# Patient Record
Sex: MALE | Race: WHITE | Employment: FULL TIME | URBAN - METROPOLITAN AREA
[De-identification: names, ages, dates, MRNs, and addresses within clinical notes are randomized per-mention and may not be internally consistent; named-entity substitution may affect disease eponyms.]

---

## 2019-03-05 DIAGNOSIS — M25.512 LEFT SHOULDER PAIN, UNSPECIFIED CHRONICITY: Primary | ICD-10-CM

## 2019-03-07 ENCOUNTER — OFFICE VISIT (OUTPATIENT)
Dept: ORTHOPEDIC SURGERY | Age: 59
End: 2019-03-07
Payer: COMMERCIAL

## 2019-03-07 VITALS — WEIGHT: 225 LBS | HEIGHT: 74 IN | BODY MASS INDEX: 28.88 KG/M2

## 2019-03-07 DIAGNOSIS — M75.102 TEAR OF LEFT ROTATOR CUFF, UNSPECIFIED TEAR EXTENT: Primary | ICD-10-CM

## 2019-03-07 PROCEDURE — 99203 OFFICE O/P NEW LOW 30 MIN: CPT | Performed by: ORTHOPAEDIC SURGERY

## 2019-03-07 RX ORDER — ROSUVASTATIN CALCIUM 10 MG/1
10 TABLET, COATED ORAL DAILY
Refills: 5 | COMMUNITY
Start: 2019-02-25

## 2019-03-15 ENCOUNTER — HOSPITAL ENCOUNTER (OUTPATIENT)
Dept: GENERAL RADIOLOGY | Age: 59
Discharge: HOME | End: 2019-03-17
Payer: COMMERCIAL

## 2019-03-15 ENCOUNTER — HOSPITAL ENCOUNTER (OUTPATIENT)
Dept: MRI IMAGING | Age: 59
Discharge: HOME | End: 2019-03-17
Payer: COMMERCIAL

## 2019-03-15 DIAGNOSIS — T15.90XA FOREIGN BODY IN EYE, UNSPECIFIED LATERALITY, INITIAL ENCOUNTER: ICD-10-CM

## 2019-03-15 DIAGNOSIS — M75.102 TEAR OF LEFT ROTATOR CUFF, UNSPECIFIED TEAR EXTENT: ICD-10-CM

## 2019-03-15 PROCEDURE — 70030 X-RAY EYE FOR FOREIGN BODY: CPT

## 2019-03-15 PROCEDURE — 73221 MRI JOINT UPR EXTREM W/O DYE: CPT

## 2019-04-02 ENCOUNTER — OFFICE VISIT (OUTPATIENT)
Dept: ORTHOPEDIC SURGERY | Age: 59
End: 2019-04-02
Payer: COMMERCIAL

## 2019-04-02 VITALS — WEIGHT: 225 LBS | HEIGHT: 74 IN | BODY MASS INDEX: 28.88 KG/M2

## 2019-04-02 DIAGNOSIS — M75.02 ADHESIVE CAPSULITIS OF LEFT SHOULDER: Primary | ICD-10-CM

## 2019-04-02 PROCEDURE — 4004F PT TOBACCO SCREEN RCVD TLK: CPT | Performed by: ORTHOPAEDIC SURGERY

## 2019-04-02 PROCEDURE — 99214 OFFICE O/P EST MOD 30 MIN: CPT | Performed by: ORTHOPAEDIC SURGERY

## 2019-04-02 PROCEDURE — G8427 DOCREV CUR MEDS BY ELIG CLIN: HCPCS | Performed by: ORTHOPAEDIC SURGERY

## 2019-04-02 PROCEDURE — 3017F COLORECTAL CA SCREEN DOC REV: CPT | Performed by: ORTHOPAEDIC SURGERY

## 2019-04-02 PROCEDURE — G8419 CALC BMI OUT NRM PARAM NOF/U: HCPCS | Performed by: ORTHOPAEDIC SURGERY

## 2019-04-02 RX ORDER — MELOXICAM 15 MG/1
15 TABLET ORAL DAILY
Qty: 30 TABLET | Refills: 2 | Status: SHIPPED | OUTPATIENT
Start: 2019-04-02 | End: 2019-05-28 | Stop reason: SDUPTHER

## 2019-04-02 NOTE — PROGRESS NOTES
Chief Complaint   Patient presents with    Shoulder Pain     Left shoulder MRI follow up. HPI:    Patient is 60 y.o. male complaining of atraumatic insidious onset of left shoulder pain and weakness for 3-4 months. Patient does not recall an injury but noticed that he had progressive weakness. He is right-hand-dominant but noticed that her left shoulder was getting weak when reaching out for such things as pens or pieces of paper. Patient tried Naprosyn, ice, rest without much relief. Patient works at a [employer]. Here today for MRI review. ROS:    Skin: (-) rash,(-) psoriasis,(-) eczema, (-)skin cancer. Neurologic: (-)numbness, (-)tingling, (-)headaches, (-) LOC. Cardiovascular: (-) Chest pain, (-) swelling in legs/feet, (-) SOB, (-) cramping in legs/feet with walking. All other review of systems negative except stated above or in HPI      Past Medical History:   Diagnosis Date    Diabetes mellitus ([facility] [address]     Environmental and seasonal allergies     Hyperlipidemia     Shingles      Past Surgical History:   Procedure Laterality Date    NASAL SEPTUM SURGERY  1996       Current Outpatient Medications:     meloxicam (MOBIC) 15 MG tablet, Take 1 tablet by mouth daily Take with food. , Disp: 30 tablet, Rfl: 2    rosuvastatin (CRESTOR) 10 MG tablet, , Disp: , Rfl: 5    metFORMIN (GLUCOPHAGE) 1000 MG tablet, Take 1,000 mg by mouth 2 times daily (with meals), Disp: , Rfl:     montelukast (SINGULAIR) 10 MG tablet, Take 10 mg by mouth nightly, Disp: , Rfl:   No Known Allergies  Social History     Socioeconomic History    Marital status:      Spouse name: Not on file    Number of children: Not on file    Years of education: Not on file    Highest education level: Not on file   Occupational History    Not on file   Social Needs    Financial resource strain: Not on file    Food insecurity:     Worry: Not on file     Inability: Not on file    Transportation needs:     Medical: Not on file     Non-medical: Not on file   Tobacco Use    Smoking status: Never Smoker   Substance and Sexual Activity    Alcohol use: Yes     Comment: social    Drug use: No    Sexual activity: Yes     Partners: Female   Lifestyle    Physical activity:     Days per week: Not on file     Minutes per session: Not on file    Stress: Not on file   Relationships    Social connections:     Talks on phone: Not on file     Gets together: Not on file     Attends Jew service: Not on file     Active member of club or organization: Not on file     Attends meetings of clubs or organizations: Not on file     Relationship status: Not on file    Intimate partner violence:     Fear of current or ex partner: Not on file     Emotionally abused: Not on file     Physically abused: Not on file     Forced sexual activity: Not on file   Other Topics Concern    Not on file   Social History Narrative    Not on file     No family history on file. Physical Exam:    Ht 6' 2\" (1.88 m)   Wt 225 lb (102.1 kg)   BMI 28.89 kg/m²     GENERAL: alert, appears stated age, cooperative, no acute distress    HEENT: Head is normocephalic, atraumatic. PERRLA. SKIN: Clean, dry, intact. There is not any cellulitis or cutaneous lesions noted in the lower extremities    PULMONARY: breathing is regular and unlabored, no acute distress    CV: The bilateral upper and lower extremities are warm and well-perfused with brisk capillary refill. 2+ pulses UE and LE bilateral.     PSYCHIATRY: Pleasant mood, appropriate behavior, follows commands    NEURO: Sensation is intact distally with light touch with no alteration. Motor exam of the lower extremities show quadriceps, hamstrings, foot dorsiflexion and plantarflexion grossly intact 5/5. LYMPH: No lymphedema present distally in upper or lower extremity. MUSCULOSKELETAL:  Shoulder Exam:  Examination of the left shoulder shows: There is not a deformity. There is not erythema.   There is not soft tissue swelling. Deltoid region is  tender to palpation. AC Joint is  tender to palpation. Clavicle is not tender to palpation. Bicipital Groove is not tender to palpation. Pectoralis  is not tender to palpation. Scapula/ trapezius is  tender to palpation. There is  weakness with supraspinatus testing. There is  pain with supraspinatus testing. Yergason Test negative. Drop Arm Test positive. Apprehension Test not tested. Cross Arm Test positive. Supraspinatus Test positive. Shoulder ROM- 120/120/20          Imaging:  Imaging reviewed from outside facility of left shoulder reveals no acute osseous abnormality or DJD. Mri Shoulder Left Wo Contrast    Result Date: 3/15/2019  Reading location:  200 Indication: Left shoulder pain, suspected rotator cuff tear. Comparison: None available. Technique: Multiplanar, multisequence MR imaging of the left shoulder was performed without administration of intravenous or intra-articular contrast. FINDINGS: There is mild increased signal within the distal fibers of the supraspinatus tendon, compatible with mild tendinopathy. The infraspinatus, subscapularis, and teres minor tendons are intact. The muscles of the rotator cuff demonstrate normal muscular volume and signal. The articular biceps tendon is within the bicipital groove. The intra-articular biceps tendon is intact. Lack of intra-articular contrast limits evaluation of the labrum. There is increased signal within the anterior labrum with a small paralabral cyst (series 3, image 14), compatible with a tear. The articular cartilage is grossly maintained. There is a trace amount of joint fluid. There is a trace amount of fluid within the subacromial/subdeltoid bursa. There is periligamentous edema and indistinctness of the inferior glenohumeral ligament, correlate for adhesive capsulitis. There is mild acromioclavicular joint arthrosis. There is no evidence of fracture or osteonecrosis.      1. Mild tendinopathy of the supraspinatus tendon. 2. Suggestion of an anterior labral tear with a small paralabral cyst. 3. Periligamentous edema and indistinctness of the inferior glenohumeral ligament, correlate for adhesive capsulitis. 4. Mild acromioclavicular joint arthrosis with trace subacromial/subdeltoid bursitis. Xr Eye Foreign Body    Result Date: 3/15/2019  Reading location:  200 Indication: MRI clearance, foreign body in eye. Comparison: None available. Technique: 3 views of the orbits were obtained. Findings: There is no metallic radiopaque foreign body. Visualized paranasal sinuses are well aerated. No metallic radiopaque foreign body is identified. Jessica Smith was seen today for shoulder pain. Diagnoses and all orders for this visit:    Adhesive capsulitis of left shoulder  -     meloxicam (MOBIC) 15 MG tablet; Take 1 tablet by mouth daily Take with food. Patient seen and examined. X-rays reviewed. Patient has had insidious onset chronic pain and weakness of left shoulder. MRI reviewed today showing signs of rotator cuff tendinopathy but more importantly adhesive capsulitis. Exam was consistent with adhesive capsulitis. Patient was educated about this condition as well as treatment options. Patient will continue with aggressive HEP, oral and topical anti-inflammatory medication. Recheck in 2-4 weeks. Leonora pain cream      Gage oJhnston DO    25 minutes was spent with patient. 50% or greater was spent counseling the patient.

## 2019-05-02 ENCOUNTER — OFFICE VISIT (OUTPATIENT)
Dept: ORTHOPEDIC SURGERY | Age: 59
End: 2019-05-02
Payer: COMMERCIAL

## 2019-05-02 VITALS — TEMPERATURE: 98 F | HEIGHT: 74 IN | BODY MASS INDEX: 29.26 KG/M2 | WEIGHT: 228 LBS

## 2019-05-02 DIAGNOSIS — M75.02 ADHESIVE CAPSULITIS OF LEFT SHOULDER: Primary | ICD-10-CM

## 2019-05-02 PROCEDURE — 99214 OFFICE O/P EST MOD 30 MIN: CPT | Performed by: ORTHOPAEDIC SURGERY

## 2019-05-02 PROCEDURE — 3017F COLORECTAL CA SCREEN DOC REV: CPT | Performed by: ORTHOPAEDIC SURGERY

## 2019-05-02 PROCEDURE — G8419 CALC BMI OUT NRM PARAM NOF/U: HCPCS | Performed by: ORTHOPAEDIC SURGERY

## 2019-05-02 PROCEDURE — G8427 DOCREV CUR MEDS BY ELIG CLIN: HCPCS | Performed by: ORTHOPAEDIC SURGERY

## 2019-05-02 PROCEDURE — 4004F PT TOBACCO SCREEN RCVD TLK: CPT | Performed by: ORTHOPAEDIC SURGERY

## 2019-05-02 NOTE — PROGRESS NOTES
Chief Complaint   Patient presents with    Shoulder Pain     f/u left shoulder pain patient states the kevin pain cream has been working and allowing him to get some sleep. Patient states he ROM has improved. HPI:    Patient is 60 y.o. male complaining of atraumatic insidious onset of left shoulder pain and weakness for 3-4 months. Patient does not recall an injury but noticed that he had progressive weakness. He is right-hand-dominant but noticed that her left shoulder was getting weak when reaching out for such things as pens or pieces of paper. Patient tried Naprosyn, ice, rest without much relief. Patient works at a [employer]. Here today in follow up continues to have some limitations in range of motion. f/u left shoulder pain patient states the kevin pain cream has been working and allowing him to get some sleep. Patient states he ROM has mildly improved. ROS:    Skin: (-) rash,(-) psoriasis,(-) eczema, (-)skin cancer. Neurologic: (-)numbness, (-)tingling, (-)headaches, (-) LOC. Cardiovascular: (-) Chest pain, (-) swelling in legs/feet, (-) SOB, (-) cramping in legs/feet with walking. All other review of systems negative except stated above or in HPI      Past Medical History:   Diagnosis Date    Diabetes mellitus ([facility] [address]     Environmental and seasonal allergies     Hyperlipidemia     Shingles      Past Surgical History:   Procedure Laterality Date    NASAL SEPTUM SURGERY  1996       Current Outpatient Medications:     SITagliptin (JANUVIA) 25 MG tablet, Take 25 mg by mouth daily, Disp: , Rfl:     meloxicam (MOBIC) 15 MG tablet, Take 1 tablet by mouth daily Take with food. , Disp: 30 tablet, Rfl: 2    rosuvastatin (CRESTOR) 10 MG tablet, , Disp: , Rfl: 5    metFORMIN (GLUCOPHAGE) 1000 MG tablet, Take 1,000 mg by mouth 2 times daily (with meals), Disp: , Rfl:     montelukast (SINGULAIR) 10 MG tablet, Take 10 mg by mouth nightly, Disp: , Rfl:   No Known Allergies  Social History Socioeconomic History    Marital status:      Spouse name: Not on file    Number of children: Not on file    Years of education: Not on file    Highest education level: Not on file   Occupational History    Not on file   Social Needs    Financial resource strain: Not on file    Food insecurity:     Worry: Not on file     Inability: Not on file    Transportation needs:     Medical: Not on file     Non-medical: Not on file   Tobacco Use    Smoking status: Never Smoker   Substance and Sexual Activity    Alcohol use: Yes     Comment: social    Drug use: No    Sexual activity: Yes     Partners: Female   Lifestyle    Physical activity:     Days per week: Not on file     Minutes per session: Not on file    Stress: Not on file   Relationships    Social connections:     Talks on phone: Not on file     Gets together: Not on file     Attends Faith service: Not on file     Active member of club or organization: Not on file     Attends meetings of clubs or organizations: Not on file     Relationship status: Not on file    Intimate partner violence:     Fear of current or ex partner: Not on file     Emotionally abused: Not on file     Physically abused: Not on file     Forced sexual activity: Not on file   Other Topics Concern    Not on file   Social History Narrative    Not on file     No family history on file. Physical Exam:    Temp 98 °F (36.7 °C)   Ht 6' 2\" (1.88 m)   Wt 228 lb (103.4 kg)   BMI 29.27 kg/m²     GENERAL: alert, appears stated age, cooperative, no acute distress    HEENT: Head is normocephalic, atraumatic. PERRLA. SKIN: Clean, dry, intact. There is not any cellulitis or cutaneous lesions noted in the lower extremities    PULMONARY: breathing is regular and unlabored, no acute distress    CV: The bilateral upper and lower extremities are warm and well-perfused with brisk capillary refill.  2+ pulses UE and LE bilateral.     PSYCHIATRY: Pleasant mood, appropriate behavior, follows commands    NEURO: Sensation is intact distally with light touch with no alteration. Motor exam of the lower extremities show quadriceps, hamstrings, foot dorsiflexion and plantarflexion grossly intact 5/5. LYMPH: No lymphedema present distally in upper or lower extremity. MUSCULOSKELETAL:  Shoulder Exam:  Examination of the left shoulder shows: There is not a deformity. There is not erythema. There is not soft tissue swelling. Deltoid region is  tender to palpation. AC Joint is  tender to palpation. Clavicle is not tender to palpation. Bicipital Groove is not tender to palpation. Pectoralis  is not tender to palpation. Scapula/ trapezius is  tender to palpation. There is  weakness with supraspinatus testing. There is  pain with supraspinatus testing. Yergason Test negative. Drop Arm Test positive. Apprehension Test not tested. Cross Arm Test positive. Supraspinatus Test positive. Shoulder ROM- 120/120/20          Imaging:  Imaging reviewed from outside facility of left shoulder reveals no acute osseous abnormality or DJD. Mri Shoulder Left Wo Contrast    Result Date: 3/15/2019  Reading location:  200 Indication: Left shoulder pain, suspected rotator cuff tear. Comparison: None available. Technique: Multiplanar, multisequence MR imaging of the left shoulder was performed without administration of intravenous or intra-articular contrast. FINDINGS: There is mild increased signal within the distal fibers of the supraspinatus tendon, compatible with mild tendinopathy. The infraspinatus, subscapularis, and teres minor tendons are intact. The muscles of the rotator cuff demonstrate normal muscular volume and signal. The articular biceps tendon is within the bicipital groove. The intra-articular biceps tendon is intact. Lack of intra-articular contrast limits evaluation of the labrum.  There is increased signal within the anterior labrum with a small paralabral cyst (series 3, image 14), compatible with a tear. The articular cartilage is grossly maintained. There is a trace amount of joint fluid. There is a trace amount of fluid within the subacromial/subdeltoid bursa. There is periligamentous edema and indistinctness of the inferior glenohumeral ligament, correlate for adhesive capsulitis. There is mild acromioclavicular joint arthrosis. There is no evidence of fracture or osteonecrosis. 1. Mild tendinopathy of the supraspinatus tendon. 2. Suggestion of an anterior labral tear with a small paralabral cyst. 3. Periligamentous edema and indistinctness of the inferior glenohumeral ligament, correlate for adhesive capsulitis. 4. Mild acromioclavicular joint arthrosis with trace subacromial/subdeltoid bursitis. Xr Eye Foreign Body    Result Date: 3/15/2019  Reading location:  200 Indication: MRI clearance, foreign body in eye. Comparison: None available. Technique: 3 views of the orbits were obtained. Findings: There is no metallic radiopaque foreign body. Visualized paranasal sinuses are well aerated. No metallic radiopaque foreign body is identified. Jessica Smith was seen today for shoulder pain. Diagnoses and all orders for this visit:    Adhesive capsulitis of left shoulder  -     [facility]        Patient seen and examined. X-rays reviewed. Patient has had insidious onset chronic pain and weakness of left shoulder. MRI reviewed today showing signs of rotator cuff tendinopathy but more importantly adhesive capsulitis. Exam was consistent with adhesive capsulitis. Patient was educated about this condition as well as treatment options. Patient would like to consider a manipulation under anesthesia with cortisone injection. I will perform an LASHAE of the left shoulder. The risks and benefits were discussed with the patient.   The risks include but are not limited to: intraoperative fracture, injuries to blood vessels and nerves, non relief of symptoms, PE/DVT, MI and death. The patient understands these risks and wishes to proceed with surgery. Clearance will be obtained prior to procedure. Junior Gomez DO    25 minutes was spent with patient. 50% or greater was spent counseling the patient.

## 2019-05-07 ENCOUNTER — HOSPITAL ENCOUNTER (OUTPATIENT)
Age: 59
Discharge: HOME | End: 2019-05-07
Payer: COMMERCIAL

## 2019-05-07 DIAGNOSIS — M75.02 ADHESIVE CAPSULITIS OF LEFT SHOULDER: ICD-10-CM

## 2019-05-07 LAB
HCT VFR BLD CALC: 37.5 % (ref 37–54)
HEMOGLOBIN: 13.5 G/DL (ref 12.5–16.5)
MCH RBC QN AUTO: 29.6 PG (ref 26–35)
MCHC RBC AUTO-ENTMCNC: 36 % (ref 32–34.5)
MCV RBC AUTO: 82.2 FL (ref 80–99.9)
PDW BLD-RTO: 12.1 FL (ref 11.5–15)
PLATELET # BLD: 140 E9/L (ref 130–450)
PMV BLD AUTO: 11.1 FL (ref 7–12)
RBC # BLD: 4.56 E12/L (ref 3.8–5.8)
WBC # BLD: 5.9 E9/L (ref 4.5–11.5)

## 2019-05-07 PROCEDURE — 80048 BASIC METABOLIC PNL TOTAL CA: CPT

## 2019-05-07 PROCEDURE — 36415 COLL VENOUS BLD VENIPUNCTURE: CPT

## 2019-05-07 PROCEDURE — 93005 ELECTROCARDIOGRAM TRACING: CPT | Performed by: ANESTHESIOLOGY

## 2019-05-07 PROCEDURE — 85027 COMPLETE CBC AUTOMATED: CPT

## 2019-05-08 LAB
ANION GAP SERPL CALCULATED.3IONS-SCNC: 9 MMOL/L (ref 7–16)
BUN BLDV-MCNC: 19 MG/DL (ref 6–20)
CALCIUM SERPL-MCNC: 8.9 MG/DL (ref 8.6–10.2)
CHLORIDE BLD-SCNC: 107 MMOL/L (ref 98–107)
CO2: 25 MMOL/L (ref 22–29)
CREAT SERPL-MCNC: 0.7 MG/DL (ref 0.7–1.2)
EKG ATRIAL RATE: 72 BPM
EKG P AXIS: 44 DEGREES
EKG P-R INTERVAL: 168 MS
EKG Q-T INTERVAL: 396 MS
EKG QRS DURATION: 104 MS
EKG QTC CALCULATION (BAZETT): 433 MS
EKG T AXIS: 24 DEGREES
EKG VENTRICULAR RATE: 72 BPM
GFR AFRICAN AMERICAN: >60
GFR NON-AFRICAN AMERICAN: >60 ML/MIN/1.73
GLUCOSE BLD-MCNC: 145 MG/DL (ref 74–99)
POTASSIUM SERPL-SCNC: 4.1 MMOL/L (ref 3.5–5)
SODIUM BLD-SCNC: 141 MMOL/L (ref 132–146)

## 2019-05-10 ENCOUNTER — ANESTHESIA EVENT (OUTPATIENT)
Dept: OPERATING ROOM | Age: 59
End: 2019-05-10
Payer: COMMERCIAL

## 2019-05-10 NOTE — ANESTHESIA PRE PROCEDURE
Department of Anesthesiology  Preprocedure Note       Name:  Ronald Rey   Age:  60 y.o.  :  1960                                          MRN:  [id number]         Date:  5/10/2019      Surgeon: Graham Chaudhry): Anisha Grimaldo DO    Procedure: LEFT SHOULDER MANIPULATION UNDER ANESTHESIA (Left )    Medications prior to admission:   Prior to Admission medications    Medication Sig Start Date End Date Taking? Authorizing Provider   SITagliptin (JANUVIA) 25 MG tablet Take 25 mg by mouth daily   Yes Historical Provider, MD   meloxicam (MOBIC) 15 MG tablet Take 1 tablet by mouth daily Take with food. 19  Yes Anisha Grimaldo DO   rosuvastatin (CRESTOR) 10 MG tablet Take 10 mg by mouth daily  19  Yes Historical Provider, MD   metFORMIN (GLUCOPHAGE) 1000 MG tablet Take 1,000 mg by mouth 2 times daily (with meals)   Yes Historical Provider, MD   montelukast (SINGULAIR) 10 MG tablet Take 10 mg by mouth nightly   Yes Historical Provider, MD       Current medications:    No current facility-administered medications for this encounter. Current Outpatient Medications   Medication Sig Dispense Refill    SITagliptin (JANUVIA) 25 MG tablet Take 25 mg by mouth daily      meloxicam (MOBIC) 15 MG tablet Take 1 tablet by mouth daily Take with food. 30 tablet 2    rosuvastatin (CRESTOR) 10 MG tablet Take 10 mg by mouth daily   5    metFORMIN (GLUCOPHAGE) 1000 MG tablet Take 1,000 mg by mouth 2 times daily (with meals)      montelukast (SINGULAIR) 10 MG tablet Take 10 mg by mouth nightly         Allergies:  No Known Allergies    Problem List:  There is no problem list on file for this patient.       Past Medical History:        Diagnosis Date    Diabetes mellitus ([address]     Environmental and seasonal allergies     Hyperlipidemia     Shingles        Past Surgical History:        Procedure Laterality Date    FINGER SURGERY      NASAL SEPTUM SURGERY         Social History:    Social History     Tobacco Use    Smoking status: Never Smoker    Smokeless tobacco: Never Used   Substance Use Topics    Alcohol use: Yes     Comment: social                                Counseling given: Not Answered      Vital Signs (Current):   Vitals:    05/07/19 1305   Weight: 228 lb (103.4 kg)   Height: 6' 2\" (1.88 m)                                              BP Readings from Last 3 Encounters:   12/03/16 132/84       NPO Status:  >8.H                                                                               BMI:   Wt Readings from Last 3 Encounters:   05/02/19 228 lb (103.4 kg)   04/02/19 225 lb (102.1 kg)   03/07/19 225 lb (102.1 kg)     Body mass index is 29.27 kg/m². CBC:   Lab Results   Component Value Date    WBC 5.9 05/07/2019    RBC 4.56 05/07/2019    HGB 13.5 05/07/2019    HCT 37.5 05/07/2019    MCV 82.2 05/07/2019    RDW 12.1 05/07/2019     05/07/2019       CMP:   Lab Results   Component Value Date     05/07/2019    K 4.1 05/07/2019     05/07/2019    CO2 25 05/07/2019    BUN 19 05/07/2019    CREATININE 0.7 05/07/2019    GFRAA >60 05/07/2019    LABGLOM >60 05/07/2019    GLUCOSE 145 05/07/2019    PROT 6.9 07/16/2017    CALCIUM 8.9 05/07/2019    BILITOT 0.6 07/16/2017    ALKPHOS 68 07/16/2017    AST 17 07/16/2017    ALT 21 07/16/2017       POC Tests: No results for input(s): POCGLU, POCNA, POCK, POCCL, POCBUN, POCHEMO, POCHCT in the last 72 hours.     Coags:   Lab Results   Component Value Date    PROTIME 10.6 06/24/2016    INR 1.0 06/24/2016    APTT 27.7 06/24/2016       HCG (If Applicable): No results found for: PREGTESTUR, PREGSERUM, HCG, HCGQUANT     ABGs: No results found for: PHART, PO2ART, ZGR2QCS, IOJ5OWM, BEART, D8KTHRBQ     Type & Screen (If Applicable):  No results found for: BRUNA [facility]    Anesthesia Evaluation  Patient summary reviewed no history of anesthetic complications:   Airway: Mallampati: II  TM distance: >3 FB   Neck ROM: full  Comment: beard  Mouth opening: > = 3 FB Dental: normal exam         Pulmonary: breath sounds clear to auscultation      (-) not a current smoker                           Cardiovascular:  Exercise tolerance: good (>4 METS),   (+) hyperlipidemia      ECG reviewed  Rhythm: regular  Rate: normal                 ROS comment: EKG=Normal sinus rhythm  Incomplete right bundle branch block  Borderline ECG  No previous ECGs available  Confirmed by Joseph Schmitz ([id number]) on 5/8/2019 12:49:05 PM    PE comment: ? split S2 ? Neuro/Psych:               GI/Hepatic/Renal:             Endo/Other:    (+) Diabetes, . ROS comment: Hx shingles Abdominal:         (-) obese     Vascular:                                    Anesthesia Plan      general     ASA 2       Induction: intravenous. MIPS: Postoperative opioids intended and Prophylactic antiemetics administered. Plan discussed with CRNA.                   Fanny Perry MD   5/10/2019

## 2019-05-14 ENCOUNTER — EVALUATION (OUTPATIENT)
Dept: PHYSICAL THERAPY | Age: 59
End: 2019-05-14
Payer: COMMERCIAL

## 2019-05-14 ENCOUNTER — ANESTHESIA (OUTPATIENT)
Dept: OPERATING ROOM | Age: 59
End: 2019-05-14
Payer: COMMERCIAL

## 2019-05-14 ENCOUNTER — HOSPITAL ENCOUNTER (OUTPATIENT)
Age: 59
Setting detail: OUTPATIENT SURGERY
Discharge: HOME | End: 2019-05-14
Attending: ORTHOPAEDIC SURGERY | Admitting: ORTHOPAEDIC SURGERY
Payer: COMMERCIAL

## 2019-05-14 VITALS
OXYGEN SATURATION: 98 % | TEMPERATURE: 98.6 F | SYSTOLIC BLOOD PRESSURE: 97 MMHG | DIASTOLIC BLOOD PRESSURE: 66 MMHG | RESPIRATION RATE: 17 BRPM

## 2019-05-14 VITALS
SYSTOLIC BLOOD PRESSURE: 150 MMHG | OXYGEN SATURATION: 99 % | BODY MASS INDEX: 27.87 KG/M2 | RESPIRATION RATE: 14 BRPM | DIASTOLIC BLOOD PRESSURE: 90 MMHG | HEIGHT: 74 IN | WEIGHT: 217.2 LBS | HEART RATE: 88 BPM | TEMPERATURE: 98.6 F

## 2019-05-14 DIAGNOSIS — M75.02 ADHESIVE CAPSULITIS OF LEFT SHOULDER: Primary | ICD-10-CM

## 2019-05-14 LAB — METER GLUCOSE: 162 MG/DL (ref 74–99)

## 2019-05-14 PROCEDURE — 3700000001 HC ADD 15 MINUTES (ANESTHESIA): Performed by: ORTHOPAEDIC SURGERY

## 2019-05-14 PROCEDURE — 97110 THERAPEUTIC EXERCISES: CPT | Performed by: PHYSICAL THERAPIST

## 2019-05-14 PROCEDURE — 82962 GLUCOSE BLOOD TEST: CPT

## 2019-05-14 PROCEDURE — 7100000011 HC PHASE II RECOVERY - ADDTL 15 MIN: Performed by: ORTHOPAEDIC SURGERY

## 2019-05-14 PROCEDURE — 7100000000 HC PACU RECOVERY - FIRST 15 MIN: Performed by: ORTHOPAEDIC SURGERY

## 2019-05-14 PROCEDURE — 7100000001 HC PACU RECOVERY - ADDTL 15 MIN: Performed by: ORTHOPAEDIC SURGERY

## 2019-05-14 PROCEDURE — 3700000000 HC ANESTHESIA ATTENDED CARE: Performed by: ORTHOPAEDIC SURGERY

## 2019-05-14 PROCEDURE — 6370000000 HC RX 637 (ALT 250 FOR IP): Performed by: ANESTHESIOLOGY

## 2019-05-14 PROCEDURE — 97162 PT EVAL MOD COMPLEX 30 MIN: CPT | Performed by: PHYSICAL THERAPIST

## 2019-05-14 PROCEDURE — 23700 MNPJ ANES SHO JT FIXJ APRATS: CPT | Performed by: ORTHOPAEDIC SURGERY

## 2019-05-14 PROCEDURE — 6360000002 HC RX W HCPCS: Performed by: NURSE ANESTHETIST, CERTIFIED REGISTERED

## 2019-05-14 PROCEDURE — 2500000003 HC RX 250 WO HCPCS: Performed by: NURSE ANESTHETIST, CERTIFIED REGISTERED

## 2019-05-14 PROCEDURE — 2580000003 HC RX 258: Performed by: ANESTHESIOLOGY

## 2019-05-14 PROCEDURE — 3600000012 HC SURGERY LEVEL 2 ADDTL 15MIN: Performed by: ORTHOPAEDIC SURGERY

## 2019-05-14 PROCEDURE — 7100000010 HC PHASE II RECOVERY - FIRST 15 MIN: Performed by: ORTHOPAEDIC SURGERY

## 2019-05-14 PROCEDURE — 3600000002 HC SURGERY LEVEL 2 BASE: Performed by: ORTHOPAEDIC SURGERY

## 2019-05-14 RX ORDER — PROMETHAZINE HYDROCHLORIDE 25 MG/ML
25 INJECTION, SOLUTION INTRAMUSCULAR; INTRAVENOUS
Status: DISCONTINUED | OUTPATIENT
Start: 2019-05-14 | End: 2019-05-14 | Stop reason: HOSPADM

## 2019-05-14 RX ORDER — DIPHENHYDRAMINE HYDROCHLORIDE 50 MG/ML
12.5 INJECTION INTRAMUSCULAR; INTRAVENOUS
Status: DISCONTINUED | OUTPATIENT
Start: 2019-05-14 | End: 2019-05-14 | Stop reason: HOSPADM

## 2019-05-14 RX ORDER — KETOROLAC TROMETHAMINE 30 MG/ML
INJECTION, SOLUTION INTRAMUSCULAR; INTRAVENOUS PRN
Status: DISCONTINUED | OUTPATIENT
Start: 2019-05-14 | End: 2019-05-14 | Stop reason: SDUPTHER

## 2019-05-14 RX ORDER — FENTANYL CITRATE 50 UG/ML
50 INJECTION, SOLUTION INTRAMUSCULAR; INTRAVENOUS EVERY 5 MIN PRN
Status: DISCONTINUED | OUTPATIENT
Start: 2019-05-14 | End: 2019-05-14 | Stop reason: HOSPADM

## 2019-05-14 RX ORDER — HYDRALAZINE HYDROCHLORIDE 20 MG/ML
5 INJECTION INTRAMUSCULAR; INTRAVENOUS EVERY 10 MIN PRN
Status: DISCONTINUED | OUTPATIENT
Start: 2019-05-14 | End: 2019-05-14 | Stop reason: HOSPADM

## 2019-05-14 RX ORDER — FAMOTIDINE 20 MG/1
20 TABLET, FILM COATED ORAL ONCE
Status: COMPLETED | OUTPATIENT
Start: 2019-05-14 | End: 2019-05-14

## 2019-05-14 RX ORDER — MIDAZOLAM HYDROCHLORIDE 1 MG/ML
INJECTION INTRAMUSCULAR; INTRAVENOUS PRN
Status: DISCONTINUED | OUTPATIENT
Start: 2019-05-14 | End: 2019-05-14 | Stop reason: SDUPTHER

## 2019-05-14 RX ORDER — HYDROCODONE BITARTRATE AND ACETAMINOPHEN 5; 325 MG/1; MG/1
1 TABLET ORAL EVERY 6 HOURS PRN
Qty: 24 TABLET | Refills: 0 | Status: SHIPPED | OUTPATIENT
Start: 2019-05-14 | End: 2019-05-21

## 2019-05-14 RX ORDER — PROMETHAZINE HYDROCHLORIDE 25 MG/ML
INJECTION, SOLUTION INTRAMUSCULAR; INTRAVENOUS PRN
Status: DISCONTINUED | OUTPATIENT
Start: 2019-05-14 | End: 2019-05-14 | Stop reason: SDUPTHER

## 2019-05-14 RX ORDER — LABETALOL HYDROCHLORIDE 5 MG/ML
5 INJECTION, SOLUTION INTRAVENOUS EVERY 10 MIN PRN
Status: DISCONTINUED | OUTPATIENT
Start: 2019-05-14 | End: 2019-05-14 | Stop reason: HOSPADM

## 2019-05-14 RX ORDER — HYDROCODONE BITARTRATE AND ACETAMINOPHEN 5; 325 MG/1; MG/1
2 TABLET ORAL PRN
Status: COMPLETED | OUTPATIENT
Start: 2019-05-14 | End: 2019-05-14

## 2019-05-14 RX ORDER — MORPHINE SULFATE 2 MG/ML
1 INJECTION, SOLUTION INTRAMUSCULAR; INTRAVENOUS EVERY 5 MIN PRN
Status: DISCONTINUED | OUTPATIENT
Start: 2019-05-14 | End: 2019-05-14 | Stop reason: HOSPADM

## 2019-05-14 RX ORDER — HYDROMORPHONE HYDROCHLORIDE 1 MG/ML
0.25 INJECTION, SOLUTION INTRAMUSCULAR; INTRAVENOUS; SUBCUTANEOUS EVERY 5 MIN PRN
Status: DISCONTINUED | OUTPATIENT
Start: 2019-05-14 | End: 2019-05-14 | Stop reason: HOSPADM

## 2019-05-14 RX ORDER — PROPOFOL 10 MG/ML
INJECTION, EMULSION INTRAVENOUS PRN
Status: DISCONTINUED | OUTPATIENT
Start: 2019-05-14 | End: 2019-05-14 | Stop reason: SDUPTHER

## 2019-05-14 RX ORDER — LIDOCAINE HYDROCHLORIDE 20 MG/ML
INJECTION, SOLUTION INFILTRATION; PERINEURAL PRN
Status: DISCONTINUED | OUTPATIENT
Start: 2019-05-14 | End: 2019-05-14 | Stop reason: SDUPTHER

## 2019-05-14 RX ORDER — ONDANSETRON 2 MG/ML
INJECTION INTRAMUSCULAR; INTRAVENOUS PRN
Status: DISCONTINUED | OUTPATIENT
Start: 2019-05-14 | End: 2019-05-14 | Stop reason: SDUPTHER

## 2019-05-14 RX ORDER — METOCLOPRAMIDE 10 MG/1
10 TABLET ORAL ONCE
Status: COMPLETED | OUTPATIENT
Start: 2019-05-14 | End: 2019-05-14

## 2019-05-14 RX ORDER — MEPERIDINE HYDROCHLORIDE 50 MG/ML
INJECTION INTRAMUSCULAR; INTRAVENOUS; SUBCUTANEOUS PRN
Status: DISCONTINUED | OUTPATIENT
Start: 2019-05-14 | End: 2019-05-14 | Stop reason: SDUPTHER

## 2019-05-14 RX ORDER — HYDROCODONE BITARTRATE AND ACETAMINOPHEN 5; 325 MG/1; MG/1
1 TABLET ORAL PRN
Status: COMPLETED | OUTPATIENT
Start: 2019-05-14 | End: 2019-05-14

## 2019-05-14 RX ORDER — FENTANYL CITRATE 50 UG/ML
INJECTION, SOLUTION INTRAMUSCULAR; INTRAVENOUS PRN
Status: DISCONTINUED | OUTPATIENT
Start: 2019-05-14 | End: 2019-05-14 | Stop reason: SDUPTHER

## 2019-05-14 RX ORDER — MEPERIDINE HYDROCHLORIDE 50 MG/ML
12.5 INJECTION INTRAMUSCULAR; INTRAVENOUS; SUBCUTANEOUS EVERY 5 MIN PRN
Status: DISCONTINUED | OUTPATIENT
Start: 2019-05-14 | End: 2019-05-14 | Stop reason: HOSPADM

## 2019-05-14 RX ORDER — GLYCOPYRROLATE 1 MG/5 ML
SYRINGE (ML) INTRAVENOUS PRN
Status: DISCONTINUED | OUTPATIENT
Start: 2019-05-14 | End: 2019-05-14 | Stop reason: SDUPTHER

## 2019-05-14 RX ORDER — SODIUM CHLORIDE, SODIUM LACTATE, POTASSIUM CHLORIDE, CALCIUM CHLORIDE 600; 310; 30; 20 MG/100ML; MG/100ML; MG/100ML; MG/100ML
INJECTION, SOLUTION INTRAVENOUS CONTINUOUS
Status: DISCONTINUED | OUTPATIENT
Start: 2019-05-14 | End: 2019-05-14 | Stop reason: HOSPADM

## 2019-05-14 RX ADMIN — SODIUM CHLORIDE, POTASSIUM CHLORIDE, SODIUM LACTATE AND CALCIUM CHLORIDE: 600; 310; 30; 20 INJECTION, SOLUTION INTRAVENOUS at 09:02

## 2019-05-14 RX ADMIN — FAMOTIDINE 20 MG: 20 TABLET ORAL at 08:45

## 2019-05-14 RX ADMIN — LIDOCAINE HYDROCHLORIDE 50 MG: 20 INJECTION, SOLUTION INFILTRATION; PERINEURAL at 09:27

## 2019-05-14 RX ADMIN — ONDANSETRON HYDROCHLORIDE 4 MG: 2 INJECTION, SOLUTION INTRAMUSCULAR; INTRAVENOUS at 09:34

## 2019-05-14 RX ADMIN — KETOROLAC TROMETHAMINE 30 MG: 30 INJECTION, SOLUTION INTRAMUSCULAR; INTRAVENOUS at 09:34

## 2019-05-14 RX ADMIN — MIDAZOLAM 2 MG: 1 INJECTION INTRAMUSCULAR; INTRAVENOUS at 09:26

## 2019-05-14 RX ADMIN — MEPERIDINE HYDROCHLORIDE 25 MG: 50 INJECTION, SOLUTION INTRAMUSCULAR; INTRAVENOUS; SUBCUTANEOUS at 09:34

## 2019-05-14 RX ADMIN — PROPOFOL 200 MG: 10 INJECTION, EMULSION INTRAVENOUS at 09:27

## 2019-05-14 RX ADMIN — FENTANYL CITRATE 50 MCG: 50 INJECTION, SOLUTION INTRAMUSCULAR; INTRAVENOUS at 09:32

## 2019-05-14 RX ADMIN — Medication 0.2 MG: at 09:29

## 2019-05-14 RX ADMIN — PROMETHAZINE HYDROCHLORIDE 12.5 MG: 25 INJECTION INTRAMUSCULAR; INTRAVENOUS at 09:36

## 2019-05-14 RX ADMIN — METOCLOPRAMIDE 10 MG: 10 TABLET ORAL at 08:45

## 2019-05-14 RX ADMIN — HYDROCODONE BITARTRATE AND ACETAMINOPHEN 1 TABLET: 5; 325 TABLET ORAL at 10:20

## 2019-05-14 RX ADMIN — FENTANYL CITRATE 50 MCG: 50 INJECTION, SOLUTION INTRAMUSCULAR; INTRAVENOUS at 09:27

## 2019-05-14 RX ADMIN — MEPERIDINE HYDROCHLORIDE 25 MG: 50 INJECTION, SOLUTION INTRAMUSCULAR; INTRAVENOUS; SUBCUTANEOUS at 09:36

## 2019-05-14 ASSESSMENT — PAIN SCALES - GENERAL
PAINLEVEL_OUTOF10: 5
PAINLEVEL_OUTOF10: 0
PAINLEVEL_OUTOF10: 6
PAINLEVEL_OUTOF10: 5

## 2019-05-14 ASSESSMENT — PAIN DESCRIPTION - LOCATION
LOCATION: SHOULDER

## 2019-05-14 ASSESSMENT — LIFESTYLE VARIABLES: SMOKING_STATUS: 0

## 2019-05-14 ASSESSMENT — PULMONARY FUNCTION TESTS
PIF_VALUE: 11
PIF_VALUE: 12
PIF_VALUE: 3
PIF_VALUE: 4
PIF_VALUE: 3
PIF_VALUE: 1
PIF_VALUE: 3
PIF_VALUE: 12
PIF_VALUE: 5
PIF_VALUE: 11

## 2019-05-14 ASSESSMENT — PAIN - FUNCTIONAL ASSESSMENT: PAIN_FUNCTIONAL_ASSESSMENT: 0-10

## 2019-05-14 ASSESSMENT — PAIN DESCRIPTION - PAIN TYPE
TYPE: SURGICAL PAIN

## 2019-05-14 NOTE — ANESTHESIA POSTPROCEDURE EVALUATION
Department of Anesthesiology  Postprocedure Note    Patient: Salvador Brantley  MRN: [id number]  YOB: 1960  Date of evaluation: 5/14/2019  Time:  10:57 AM     Procedure Summary     Date:  05/14/19 Room / Location:  [address] [place] [address] 01 / 315 [facility]    Anesthesia Start:  [address] [facility] Anesthesia Stop:  1985    Procedure:  LEFT SHOULDER MANIPULATION UNDER ANESTHESIA (Left ) Diagnosis:  (ADHESIVE CAPSULITIS OF LEFT SHOULDER)    Surgeon:  Johanna Walter DO Responsible Provider:  Claudia Jackson MD    Anesthesia Type:  general ASA Status:  2          Anesthesia Type: general    Joaquin Phase I: Joaquin Score: 10    Joaquin Phase II: Joaquin Score: 10    Last vitals: Reviewed and per EMR flowsheets.        Anesthesia Post Evaluation    Patient location during evaluation: PACU  Patient participation: complete - patient participated  Level of consciousness: awake and alert  Airway patency: patent  Nausea & Vomiting: no vomiting and no nausea  Complications: no  Cardiovascular status: hemodynamically stable  Respiratory status: room air and spontaneous ventilation  Hydration status: stable

## 2019-05-14 NOTE — PROGRESS NOTES
[address] [facility] OUTPATIENT REHABILITATION  PHYSICAL THERAPY INITIAL EVALUATION         Date:  2019   Patient: Beth Hamilton  : 1960  MRN: [id number]  Referring Provider: Onofre Fields DO  1933 [address], [facility]     Medical Diagnosis:   M75.02 (ICD-10-CM) - Adhesive capsulitis of left shoulder     SUBJECTIVE:     Onset date: 10/2018    Onset[de-identified] Insidious onset    Mechanism of Injury / History: nothing. Patient is right handed. Previous PT: none    Medical Management for Current Problem: medications, LASHAE    Chief complaint: pain and decreased motion    Behavior: condition is getting better    Pain: constant  Current: 8/10        Aggravated by: reaching out    Relieved by: ice    Symptom Type/Quality: aching  Location[de-identified] anterior, posterior, superior        Imaging results: No results found. Past Medical History:  Past Medical History:   Diagnosis Date    Diabetes mellitus ([address]     Environmental and seasonal allergies     Hyperlipidemia     Shingles      Past Surgical History:   Procedure Laterality Date    FINGER SURGERY      NASAL SEPTUM SURGERY  1996    SHOULDER SURGERY Left 2019    manipulation        Medications:   Current Outpatient Medications   Medication Sig Dispense Refill    HYDROcodone-acetaminophen (NORCO) 5-325 MG per tablet Take 1 tablet by mouth every 6 hours as needed for Pain for up to 7 days. 24 tablet 0    SITagliptin (JANUVIA) 25 MG tablet Take 25 mg by mouth daily      meloxicam (MOBIC) 15 MG tablet Take 1 tablet by mouth daily Take with food. 30 tablet 2    rosuvastatin (CRESTOR) 10 MG tablet Take 10 mg by mouth daily   5    metFORMIN (GLUCOPHAGE) 1000 MG tablet Take 1,000 mg by mouth 2 times daily (with meals)      montelukast (SINGULAIR) 10 MG tablet Take 10 mg by mouth nightly       No current facility-administered medications for this visit.       Facility-Administered Medications Ordered in Other Visits Medication Dose Route Frequency Provider Last Rate Last Dose    lactated ringers infusion   Intravenous Continuous Chiki Reid MD   Stopped at 05/14/19 1026    morphine (PF) injection 1 mg  1 mg Intravenous Q5 Min PRN Chiki Reid MD        fentaNYL (SUBLIMAZE) injection 50 mcg  50 mcg Intravenous Q5 Min PRN Chiki Reid MD        HYDROmorphone HCl PF (DILAUDID) injection 0.25 mg  0.25 mg Intravenous Q5 Min PRN Chiki Reid MD        fentaNYL (SUBLIMAZE) injection 50 mcg  50 mcg Intravenous Q5 Min PRN Cora Calderón MD        diphenhydrAMINE (BENADRYL) injection 12.5 mg  12.5 mg Intravenous Once PRN Cora Calderón MD        promethazine (PHENERGAN) injection 25 mg  25 mg Intramuscular Once PRN Cora Calderón MD        labetalol (NORMODYNE;TRANDATE) injection 5 mg  5 mg Intravenous Q10 Min PRN Chiki Reid MD        hydrALAZINE (APRESOLINE) injection 5 mg  5 mg Intravenous Q10 Min PRN Chiki Reid MD        meperidine (DEMEROL) injection 12.5 mg  12.5 mg Intravenous Q5 Min PRN Cora Calderón MD           Occupation: . Physical demands include: lifting, carrying, pushing, pulling heavy weighted items. Status: full time.  12hr/days  4 days on 4 days off    Exercise regimen: none    Hobbies: hunting, fishing    Patient Goals: pain relief, return to prior activity, full use of arm    Precautions/Contraindications: none    OBJECTIVE:     Observations: well nourished male                 Palpation: No tenderness     Joint/Motion:  Right Shoulder:  AROM: 150° Forward elevation,  90° ER,  IR to T8  PROM: 170° Forward elevation,  100° ER,  70° IR    Left Shoulder:  AROM: 130° Forward elevation,  45° ER,  IR to L2  PROM: 150° Forward elevation,  60° ER , 60° IR    Strength:  Right Shoulder: Flexion 5/5,  Abduction 5/5, ER 5/5, IR 5/5      Left Shoulder: Flexion 4-/5,  Abduction 4-/5, ER 4/5, IR 5/5 Special Tests/Functional Screens:  NA secondary to LASHAE today  [] Jose Alejandro []+ / [] -  [] Athena's []+ / [] -   []  Aung's []+ / [] -    []GH drawer []+ / [] -    [] Bicep Load []+ / [] -   [] Crank []+ / [] -  [] Radha Bourne []+ / [] -   [] Elbow Varus []+ / [] -  [] Neer's []+ / [] -      [] Speed's []+ / [] -   []Sulcus Sign []+ / [] -   [] Apprehension []+ / [] -   [] Bicep Load II []+ / [] -   [] Orlando Arreola []+ / [] -   [] Elbow Valgus []+ / [] -     [] Other: []+ / [] -         ASSESSMENT     Outcome Measure:   QuickDASH 36%    Problems:    Pain reported 8/10   ROM decreased   Strength decreased   Decreased functional ability with use of left upper extremity    [x] There are no barriers affecting plan of care or recovery    [] Barriers to this patient's plan of care or recovery include. Domestic Concerns:  [x] No  [] Yes:    Short Term goals (2-3 weeks)   Decrease reported pain to 4/10   Increase AROM to 150/70/T10   Increase Strength to 4+/5    QuickDASH (Disorders of the Arm, Shoulder, and Hand) 24% disability    Long Term goals (4-6 weeks)   Decrease reported pain to 1-2/10   Increase ROM to 155/90/T8   Increase Strength to 5/5    QuickDASH 10% disability   Independent with Home Exercise Programs    Rehab Potential: [x] Good  [] Fair  [] Poor    PLAN       Treatment Plan:   [x] Therapeutic Exercise  [x] Therapeutic Activity  [x] Neuromuscular Re-education   [] Gait Training  [] Balance Training  [] Aerobic conditioning  [] Manual Therapy  [] Massage/Fascial release   [] Work/Sport specific activities    [] Pain Neuroscience [] Cold/hotpack  [] Vasocompression  [] Electrical Stimulation  [] Lumbar/Cervical Traction  [] Ultrasound   [] Iontophoresis: 4 mg/mL Dexamethasone Sodium Phosphate 40-80 mAmin        [x] Instruction in HEP      []  Medication allergies reviewed for use of Dexamethasone Sodium Phosphate 4mg/ml  with iontophoresis treatments. Patient is not allergic.       The

## 2019-05-14 NOTE — H&P
Chief Complaint   Patient presents with    Shoulder Pain       f/u left shoulder pain patient states the kevin pain cream has been working and allowing him to get some sleep. Patient states he ROM has improved.            HPI:     Patient is 60 y.o. male complaining of atraumatic insidious onset of left shoulder pain and weakness for 3-4 months. Patient does not recall an injury but noticed that he had progressive weakness. He is right-hand-dominant but noticed that her left shoulder was getting weak when reaching out for such things as pens or pieces of paper. Patient tried Naprosyn, ice, rest without much relief. Patient works at a [employer]. Here today in follow up continues to have some limitations in range of motion.        f/u left shoulder pain patient states the kevin pain cream has been working and allowing him to get some sleep. Patient states he ROM has mildly improved.     ROS:     Skin: (-) rash,(-) psoriasis,(-) eczema, (-)skin cancer. Neurologic: (-)numbness, (-)tingling, (-)headaches, (-) LOC. Cardiovascular: (-) Chest pain, (-) swelling in legs/feet, (-) SOB, (-) cramping in legs/feet with walking.     All other review of systems negative except stated above or in HPI        Past Medical History        Past Medical History:   Diagnosis Date    Diabetes mellitus ([facility] [address]      Environmental and seasonal allergies      Hyperlipidemia      Shingles           Past Surgical History   Past Surgical History:   Procedure Laterality Date    NASAL SEPTUM SURGERY   1996           Current Medication      Current Outpatient Medications:     SITagliptin (JANUVIA) 25 MG tablet, Take 25 mg by mouth daily, Disp: , Rfl:     meloxicam (MOBIC) 15 MG tablet, Take 1 tablet by mouth daily Take with food. , Disp: 30 tablet, Rfl: 2    rosuvastatin (CRESTOR) 10 MG tablet, , Disp: , Rfl: 5    metFORMIN (GLUCOPHAGE) 1000 MG tablet, Take 1,000 mg by mouth 2 times daily (with meals), Disp: , Rfl:     montelukast (SINGULAIR) 10 MG tablet, Take 10 mg by mouth nightly, Disp: , Rfl:      No Known Allergies  Social History               Socioeconomic History    Marital status:        Spouse name: Not on file    Number of children: Not on file    Years of education: Not on file    Highest education level: Not on file   Occupational History    Not on file   Social Needs    Financial resource strain: Not on file    Food insecurity:       Worry: Not on file       Inability: Not on file    Transportation needs:       Medical: Not on file       Non-medical: Not on file   Tobacco Use    Smoking status: Never Smoker   Substance and Sexual Activity    Alcohol use: Yes       Comment: social    Drug use: No    Sexual activity: Yes       Partners: Female   Lifestyle    Physical activity:       Days per week: Not on file       Minutes per session: Not on file    Stress: Not on file   Relationships    Social connections:       Talks on phone: Not on file       Gets together: Not on file       Attends Jew service: Not on file       Active member of club or organization: Not on file       Attends meetings of clubs or organizations: Not on file       Relationship status: Not on file    Intimate partner violence:       Fear of current or ex partner: Not on file       Emotionally abused: Not on file       Physically abused: Not on file       Forced sexual activity: Not on file   Other Topics Concern    Not on file   Social History Narrative    Not on file         Family History   No family history on file.              Physical Exam:     /73   Pulse 69   Temp 97 °F (36.1 °C)   Resp 18   Ht 6' 2\" (1.88 m)   Wt 217 lb 3.2 oz (98.5 kg)   SpO2 98%   BMI 27.89 kg/m²        GENERAL: alert, appears stated age, cooperative, no acute distress     HEENT: Head is normocephalic, atraumatic. PERRLA.      SKIN: Clean, dry, intact.  There is not any cellulitis or cutaneous lesions noted in the lower extremities     PULMONARY: breathing is regular and unlabored, no acute distress     CV: The bilateral upper and lower extremities are warm and well-perfused with brisk capillary refill. 2+ pulses UE and LE bilateral.      PSYCHIATRY: Pleasant mood, appropriate behavior, follows commands     NEURO: Sensation is intact distally with light touch with no alteration. Motor exam of the lower extremities show quadriceps, hamstrings, foot dorsiflexion and plantarflexion grossly intact 5/5.     LYMPH: No lymphedema present distally in upper or lower extremity.      MUSCULOSKELETAL:  Shoulder Exam:  Examination of the left shoulder shows: There is not a deformity. There is not erythema. There is not soft tissue swelling. Deltoid region is  tender to palpation. AC Joint is  tender to palpation. Clavicle is not tender to palpation. Bicipital Groove is not tender to palpation. Pectoralis  is not tender to palpation. Scapula/ trapezius is  tender to palpation. There is  weakness with supraspinatus testing. There is  pain with supraspinatus testing. Yergason Test negative. Drop Arm Test positive. Apprehension Test not tested. Cross Arm Test positive. Supraspinatus Test positive. Shoulder ROM- 120/120/20              Imaging:  Imaging reviewed from outside facility of left shoulder reveals no acute osseous abnormality or DJD.     Mri Shoulder Left Wo Contrast     Result Date: 3/15/2019  Reading location:  200 Indication: Left shoulder pain, suspected rotator cuff tear. Comparison: None available. Technique: Multiplanar, multisequence MR imaging of the left shoulder was performed without administration of intravenous or intra-articular contrast. FINDINGS: There is mild increased signal within the distal fibers of the supraspinatus tendon, compatible with mild tendinopathy. The infraspinatus, subscapularis, and teres minor tendons are intact.  The muscles of the rotator cuff demonstrate normal muscular volume and signal. The articular biceps tendon is within the bicipital groove. The intra-articular biceps tendon is intact. Lack of intra-articular contrast limits evaluation of the labrum. There is increased signal within the anterior labrum with a small paralabral cyst (series 3, image 14), compatible with a tear. The articular cartilage is grossly maintained. There is a trace amount of joint fluid. There is a trace amount of fluid within the subacromial/subdeltoid bursa. There is periligamentous edema and indistinctness of the inferior glenohumeral ligament, correlate for adhesive capsulitis. There is mild acromioclavicular joint arthrosis. There is no evidence of fracture or osteonecrosis.      1. Mild tendinopathy of the supraspinatus tendon. 2. Suggestion of an anterior labral tear with a small paralabral cyst. 3. Periligamentous edema and indistinctness of the inferior glenohumeral ligament, correlate for adhesive capsulitis. 4. Mild acromioclavicular joint arthrosis with trace subacromial/subdeltoid bursitis.     Xr Eye Foreign Body     Result Date: 3/15/2019  Reading location:  200 Indication: MRI clearance, foreign body in eye. Comparison: None available. Technique: 3 views of the orbits were obtained. Findings: There is no metallic radiopaque foreign body. Visualized paranasal sinuses are well aerated.      No metallic radiopaque foreign body is identified.        Manpreet was seen today for shoulder pain.     Diagnoses and all orders for this visit:     Adhesive capsulitis of left shoulder  -     [facility], [facility]           Patient seen and examined. X-rays reviewed. Patient has had insidious onset chronic pain and weakness of left shoulder. MRI reviewed today showing signs of rotator cuff tendinopathy but more importantly adhesive capsulitis. Exam was consistent with adhesive capsulitis. Patient was educated about this condition as well as treatment options.  Patient would like to consider a manipulation under anesthesia with cortisone injection.     I will perform an LASHAE of the left shoulder. The risks and benefits were discussed with the patient. The risks include but are not limited to: intraoperative fracture, injuries to blood vessels and nerves, non relief of symptoms, PE/DVT, MI and death. The patient understands these risks and wishes to proceed with surgery.

## 2019-05-20 ENCOUNTER — TREATMENT (OUTPATIENT)
Dept: PHYSICAL THERAPY | Age: 59
End: 2019-05-20
Payer: COMMERCIAL

## 2019-05-20 DIAGNOSIS — M75.02 ADHESIVE CAPSULITIS OF LEFT SHOULDER: Primary | ICD-10-CM

## 2019-05-20 PROCEDURE — 97110 THERAPEUTIC EXERCISES: CPT | Performed by: PHYSICAL THERAPIST

## 2019-05-20 NOTE — PROGRESS NOTES
Training          GT     Neuro Re-education NR     Modalities     Non-Billable Service Time     Other     Total Time/Units 45 3

## 2019-05-21 ENCOUNTER — TREATMENT (OUTPATIENT)
Dept: PHYSICAL THERAPY | Age: 59
End: 2019-05-21
Payer: COMMERCIAL

## 2019-05-21 DIAGNOSIS — M75.02 ADHESIVE CAPSULITIS OF LEFT SHOULDER: Primary | ICD-10-CM

## 2019-05-21 PROCEDURE — 97110 THERAPEUTIC EXERCISES: CPT | Performed by: PHYSICAL THERAPIST

## 2019-05-21 NOTE — PROGRESS NOTES
Physical Therapy Daily Treatment Note    Date: 2019  Patient Name: Alexis Mejia  : 1960   MRN: [id number]  DOInjury: 10/2018 insidious onset  DOSx: LASHAE 19   Referring Provider: Radha Ocampo DO  1933 [address]:   M75.02 (ICD-10-CM) - Adhesive capsulitis of left shoulder    Outcome Measure:  QuickDASH 36%    S: Performing HEP. A little sore, but ok. O:   Time 0742-1016     Visit 3 Repeat outcome measure at mid point and end. Pain 2-3/10     ROM Left Shoulder:  AROM: 150° Forward elevation,  45° ER,  IR to L2  PROM: 160° Forward elevation,  60° ER , 35° IR     Modalities            Manual      Stretching FE, ER, IR X 20 min  TE         Stretch      Assisted behind back stretch w/ R hand Reviewed  TE   Wall Flexion and wall ABD Reviewed. Recommended patient do at corner to allow full stretch  Pinky along wall for ABD TE   Wall ER stretch Reviewed  TE   Towel IR  Reviewed   TE   S-lying ER stretch  1 x 30 sec for review  TE   S-lying IR stretch  1 x 30 sec for review  TE   Supine hands behind head ER stretch  1 x 30 sec for review  TE   Exercise      Shrugs AROM      Pendulum Ex      UBE      Pulleys - flex      Pulleys-IR      Supine wand chest press      Supine wand flex 2 x 15 done as warm up  TE   Supine wand ER/IR 2 x 15 done as warm up  TE   Supine flexion      S-lying ABD      S-lying ER      Standing wand flex      Standing flexion      Standing ABD            ROWS: H Functional activities  To aid in ROM and strength needed for reaching , lifting ,pushing and pulling at home/work    ROWS: M  \"    ROWS: L  \"    ER  \"    IR  \"                A:  Tolerated well. P: Continue with stretching. Home exercise cues not necessary this visit. Should only require stretching by therapist next visit.       Nahun Chris, FLORES    Treatment Charges: Mins Units   Initial Evaluation     Re-Evaluation     Ther Exercise TE 30 2   Manual Therapy     MT     Ther Activities        TA     Gait Training          GT     Neuro Re-education NR     Modalities     Non-Billable Service Time     Other     Total Time/Units 30 2

## 2019-05-28 ENCOUNTER — TREATMENT (OUTPATIENT)
Dept: PHYSICAL THERAPY | Age: 59
End: 2019-05-28
Payer: COMMERCIAL

## 2019-05-28 DIAGNOSIS — M75.02 ADHESIVE CAPSULITIS OF LEFT SHOULDER: ICD-10-CM

## 2019-05-28 DIAGNOSIS — M75.02 ADHESIVE CAPSULITIS OF LEFT SHOULDER: Primary | ICD-10-CM

## 2019-05-28 PROCEDURE — 97140 MANUAL THERAPY 1/> REGIONS: CPT

## 2019-05-28 PROCEDURE — 97110 THERAPEUTIC EXERCISES: CPT

## 2019-05-28 RX ORDER — MELOXICAM 15 MG/1
15 TABLET ORAL DAILY
Qty: 30 TABLET | Refills: 2 | Status: SHIPPED
Start: 2019-05-28 | End: 2021-11-03 | Stop reason: ALTCHOICE

## 2019-05-28 NOTE — PROGRESS NOTES
Physical Therapy Daily Treatment Note    Date: 2019  Patient Name: Mahad Lemus  : 1960   MRN: [id number]  DOInjury: 10/2018 insidious onset  DOSx: LASHAE 19   Referring Provider: Lasha Dugan DO  1933 [address]:   M75.02 (ICD-10-CM) - Adhesive capsulitis of left shoulder    Outcome Measure:  QuickDASH 36%    S: Performing HEP. Still A little sore, but ok. O:   Time 8501-9853 am     Visit 4/  Repeat outcome measure at mid point and end. Pain 2-3/10     ROM Left Shoulder:  AROM: 150° Forward elevation,  45° ER,  IR to L2  PROM: 160° Forward elevation,  60° ER , 35° IR     Modalities            Manual      Stretching FE, ER, IR X 20 min  MT         Stretch      Assisted behind back stretch w/ R hand Reviewed  TE   Wall Flexion and wall ABD Reviewed. Recommended patient do at corner to allow full stretch  Pinky along wall for ABD TE   Wall ER stretch Reviewed  TE   Towel IR  Reviewed   TE   S-lying ER stretch  1 x 30 sec for review  TE   S-lying IR stretch  1 x 30 sec for review  TE   Supine hands behind head ER stretch  1 x 30 sec for review  TE   Exercise      Shrugs AROM      Pendulum Ex      UBE      Pulleys - flex      Pulleys-IR      Supine wand chest press      Supine wand flex 2 x 15 done as warm up  TE   Supine wand ER/IR 2 x 15 done as warm up  TE   Supine flexion      S-lying ABD      S-lying ER      Standing wand flex      Standing flexion      Standing ABD            ROWS: H Functional activities  To aid in ROM and strength needed for reaching , lifting ,pushing and pulling at home/work    ROWS: M  \"    ROWS: L  \"    ER  \"    IR  \"                A:  Tolerated well. P: Continue with stretching. Home exercise cues not necessary this visit. Should only require stretching by therapist next visit.       Misael Linares PTA    Treatment Charges: Mins Units   Initial Evaluation     Re-Evaluation     Ther Exercise         TE 10 1   Manual Therapy     MT 20 1   Ther Activities        TA     Gait Training          GT     Neuro Re-education NR     Modalities     Non-Billable Service Time     Other     Total Time/Units 30 2

## 2019-05-29 ENCOUNTER — TREATMENT (OUTPATIENT)
Dept: PHYSICAL THERAPY | Age: 59
End: 2019-05-29
Payer: COMMERCIAL

## 2019-05-29 DIAGNOSIS — M75.02 ADHESIVE CAPSULITIS OF LEFT SHOULDER: Primary | ICD-10-CM

## 2019-05-29 PROCEDURE — 97140 MANUAL THERAPY 1/> REGIONS: CPT

## 2019-05-29 PROCEDURE — 97110 THERAPEUTIC EXERCISES: CPT

## 2019-05-29 NOTE — PROGRESS NOTES
Physical Therapy Daily Treatment Note    Date: 2019  Patient Name: Mary Carmen Velásquez  : 1960   MRN: [id number]  DOInjury: 10/2018 insidious onset  DOSx: LASHAE 19   Referring Provider: Kathryn Kaufman DO  1933 [address] [facility]:   M75.02 (ICD-10-CM) - Adhesive capsulitis of left shoulder    Outcome Measure:  QuickDASH 36%    S: Pt reports feeling about the same with no changes. Performing HEP.   O:   Time 08:00-8:25     Visit 5/  Repeat outcome measure at mid point and end. Pain /10     ROM Left Shoulder:  AROM: 150° Forward elevation,  45° ER,  IR to L2  PROM: 160° Forward elevation,  60° ER , 35° IR   AROM: 161* forward elevation, 65* ER, 75* IR    Modalities            Manual      Stretching FE, ER, IR X 20 min  MT         Stretch      Assisted behind back stretch w/ R hand Reviewed  TE   Wall Flexion and wall ABD Reviewed. Recommended patient do at corner to allow full stretch  Pinky along wall for ABD TE   Wall ER stretch Reviewed  TE   Towel IR  Reviewed   TE   S-lying ER stretch  1 x 30 sec for review  TE   S-lying IR stretch  1 x 30 sec for review  TE   Supine hands behind head ER stretch  1 x 30 sec for review  TE   Exercise      Shrugs AROM      Pendulum Ex      UBE      Pulleys - flex      Pulleys-IR      Supine wand chest press      Supine wand flex 2 x 15   TE   Supine wand ER/IR 2 x 15   TE   Supine flexion      S-lying ABD      S-lying ER      Standing wand flex      Standing flexion      Standing ABD            ROWS: H Functional activities  To aid in ROM and strength needed for reaching , lifting ,pushing and pulling at home/work    ROWS: M  \"    ROWS: L  \"    ER  \"    IR  \"                A:  Tolerated well. Pt demo'd improved ROM. P: Continue with stretching. Home exercise cues not necessary this visit.    Angella Bradshaw PTA    Treatment Charges: Mins Units   Initial Evaluation     Re-Evaluation     Ther Exercise         TE 10 1   Manual Therapy     MT 15 1   Ther Activities        TA     Gait Training          GT     Neuro Re-education NR     Modalities     Non-Billable Service Time     Other     Total Time/Units 25 2

## 2019-06-03 ENCOUNTER — TREATMENT (OUTPATIENT)
Dept: PHYSICAL THERAPY | Age: 59
End: 2019-06-03
Payer: COMMERCIAL

## 2019-06-03 DIAGNOSIS — M75.02 ADHESIVE CAPSULITIS OF LEFT SHOULDER: Primary | ICD-10-CM

## 2019-06-03 PROCEDURE — 97110 THERAPEUTIC EXERCISES: CPT

## 2019-06-03 PROCEDURE — 97140 MANUAL THERAPY 1/> REGIONS: CPT

## 2019-06-03 NOTE — PROGRESS NOTES
Physical Therapy Daily Treatment Note    Date: 6/3/2019  Patient Name: Georgina Garcia  : 1960   MRN: [id number]  DOInjury: 10/2018 insidious onset  DOSx: LASHAE 5-14-19   Referring Provider: Michelle Segovia DO  1933 [address] [facility]                           Medical Diagnosis:   M75.02 (ICD-10-CM) - Adhesive capsulitis of left shoulder                               Dr's appt weds 2019     Outcome Measure:  QuickDASH 36%      S: Pt reports feeling much better today. Performing HEP.   O:   Time 08: am     Visit 6/  Repeat outcome measure at mid point and end. Pain 3 /10     ROM Left Shoulder:  AROM: 150° Forward elevation,  45° ER,  IR to L2  PROM: 160° Forward elevation,  60° ER , 35° IR   AROM: 161* forward elevation, 65* ER, 75* IR    Modalities            Manual      Stretching FE, ER, IR X 15min  MT         Stretch      Assisted behind back stretch w/ R hand Reviewed  TE   Wall Flexion and wall ABD Reviewed. Recommended patient do at corner to allow full stretch  Pinky along wall for ABD TE   Wall ER stretch Reviewed  TE   Towel IR  Reviewed   TE   S-lying ER stretch  1 x 30 sec for review  TE   S-lying IR stretch  1 x 30 sec for review  TE   Supine hands behind head ER stretch  1 x 30 sec for review  TE   Exercise      Shrugs AROM      Pendulum Ex      UBE      Pulleys - flex      Pulleys-IR      Supine wand chest press 2 x 15 Without pillows  TE   Supine wand flex 2 x 15   TE   Supine wand ER/IR 2 x 15   TE   Supine flexion      S-lying ABD      S-lying ER      Sitting press ups    TE   Standing wand flex      Standing flexion      Standing ABD            ROWS: H Functional activities  To aid in ROM and strength needed for reaching , lifting ,pushing and pulling at home/work    ROWS: M  \"    ROWS: L  \"    ER  \"    IR  \"                A:  Tolerated well. .   P: Continue with stretching.  Oumar Fuller PTA    Treatment Charges: Mins Units   Initial Evaluation Re-Evaluation     Ther Exercise         TE 10 1   Manual Therapy     MT 15 1   Ther Activities        TA     Gait Training          GT     Neuro Re-education NR     Modalities     Non-Billable Service Time     Other     Total Time/Units 25 2

## 2019-06-05 ENCOUNTER — TREATMENT (OUTPATIENT)
Dept: PHYSICAL THERAPY | Age: 59
End: 2019-06-05
Payer: COMMERCIAL

## 2019-06-05 ENCOUNTER — OFFICE VISIT (OUTPATIENT)
Dept: ORTHOPEDIC SURGERY | Age: 59
End: 2019-06-05
Payer: COMMERCIAL

## 2019-06-05 VITALS — HEIGHT: 74 IN | BODY MASS INDEX: 28.88 KG/M2 | WEIGHT: 225 LBS

## 2019-06-05 DIAGNOSIS — M75.02 ADHESIVE CAPSULITIS OF LEFT SHOULDER: Primary | ICD-10-CM

## 2019-06-05 PROCEDURE — 3017F COLORECTAL CA SCREEN DOC REV: CPT | Performed by: ORTHOPAEDIC SURGERY

## 2019-06-05 PROCEDURE — G8427 DOCREV CUR MEDS BY ELIG CLIN: HCPCS | Performed by: ORTHOPAEDIC SURGERY

## 2019-06-05 PROCEDURE — 97110 THERAPEUTIC EXERCISES: CPT

## 2019-06-05 PROCEDURE — 99213 OFFICE O/P EST LOW 20 MIN: CPT | Performed by: ORTHOPAEDIC SURGERY

## 2019-06-05 PROCEDURE — G8419 CALC BMI OUT NRM PARAM NOF/U: HCPCS | Performed by: ORTHOPAEDIC SURGERY

## 2019-06-05 PROCEDURE — 97140 MANUAL THERAPY 1/> REGIONS: CPT

## 2019-06-05 PROCEDURE — 1036F TOBACCO NON-USER: CPT | Performed by: ORTHOPAEDIC SURGERY

## 2019-06-05 NOTE — PROGRESS NOTES
Physical Therapy Daily Treatment Note    Date: 2019  Patient Name: Valeriano Lundberg  : 1960   MRN: [id number]  DOInjury: 10/2018 insidious onset  DOSx: LASHAE 5-14-19   Referring Provider: Chang Sauceda DO   [address], [facility]                           Medical Diagnosis:   M75.02 (ICD-10-CM) - Adhesive capsulitis of left shoulder                               's appt weds 2019     Outcome Measure:  QuickDASH 36%      S: Pt reports feeling much better today. Performing HEP.   O:   Time 1550-7045 am  Pt has a 0830 am 's appt. Today 2019    Visit  Repeat outcome measure at mid point and end. Pain 3 /10     ROM Left Shoulder:  AROM: 1   AROM: 161* forward elevation, 65* ER, 75* IR    Modalities            Manual      Stretching FE, ER, IR X 15 min  MT         Stretch      Assisted behind back stretch w/ R hand Reviewed  TE   Wall Flexion and wall ABD Reviewed. Recommended patient do at corner to allow full stretch  Pinky along wall for ABD TE   Wall ER stretch Reviewed  TE   Towel IR  Reviewed   TE   S-lying ER stretch  1 x 30 sec for review  TE   S-lying IR stretch  1 x 30 sec for review  TE   Supine hands behind head ER stretch  1 x 30 sec for review  TE   Exercise      Shrugs AROM      Pendulum Ex      UBE      Pulleys - flex      Pulleys-IR      Supine wand chest press 2 x 15 Without pillows  TE   Supine wand flex 2 x 15   TE   Supine wand ER/IR 2 x 15   TE   Supine flexion      S-lying ABD      S-lying ER      Sitting press ups    TE   Standing wand flex      Standing flexion      Standing ABD            ROWS: H Functional activities  To aid in ROM and strength needed for reaching , lifting ,pushing and pulling at home/work    ROWS: M  \"    ROWS: L  \"    ER  \"    IR  \"                A:  Tolerated well. .   P: Continue with stretching. . Pt has [facility] appt today after PT rx session.    Samantha Mahoney PTA    Treatment Charges: Mins Units   Initial Evaluation Re-Evaluation     Ther Exercise         TE 10 1   Manual Therapy     MT 15 1   Ther Activities        TA     Gait Training          GT     Neuro Re-education NR     Modalities     Non-Billable Service Time     Other     Total Time/Units 25 2

## 2019-06-05 NOTE — PROGRESS NOTES
Chief Complaint   Patient presents with    Shoulder Pain     [place] [facility] [place], has been in PT         HPI:    Patient is 60 y.o. male complaining of atraumatic insidious onset of left shoulder pain and weakness for 3-4 months. Patient does not recall an injury but noticed that he had progressive weakness. He is right-hand-dominant but noticed that her left shoulder was getting weak when reaching out for such things as pens or pieces of paper. Patient is now [place] [facility] [place]. He is improving and currently in PT.        ROS:    Skin: (-) rash,(-) psoriasis,(-) eczema, (-)skin cancer. Neurologic: (-)numbness, (-)tingling, (-)headaches, (-) LOC. Cardiovascular: (-) Chest pain, (-) swelling in legs/feet, (-) SOB, (-) cramping in legs/feet with walking.     All other review of systems negative except stated above or in HPI      Past Medical History:   Diagnosis Date    Diabetes mellitus ([facility] [address]     Environmental and seasonal allergies     Hyperlipidemia     Shingles      Past Surgical History:   Procedure Laterality Date    CLOSED MANIPULATION SHOULDER Left 5/14/2019    LEFT SHOULDER MANIPULATION UNDER ANESTHESIA performed by Michelle Segovia DO at [address]  Left 05/14/2019    manipulation        Current Outpatient Medications:     meloxicam (MOBIC) 15 MG tablet, TAKE 1 TABLET BY MOUTH DAILY TAKE WITH FOOD., Disp: 30 tablet, Rfl: 2    SITagliptin (JANUVIA) 25 MG tablet, Take 25 mg by mouth daily, Disp: , Rfl:     rosuvastatin (CRESTOR) 10 MG tablet, Take 10 mg by mouth daily , Disp: , Rfl: 5    metFORMIN (GLUCOPHAGE) 1000 MG tablet, Take 1,000 mg by mouth 2 times daily (with meals), Disp: , Rfl:     montelukast (SINGULAIR) 10 MG tablet, Take 10 mg by mouth nightly, Disp: , Rfl:   No Known Allergies  Social History     Socioeconomic History    Marital status:      Spouse name: Not on file    Number of alteration. Motor exam of the lower extremities show quadriceps, hamstrings, foot dorsiflexion and plantarflexion grossly intact 5/5. LYMPH: No lymphedema present distally in upper or lower extremity. MUSCULOSKELETAL:  Shoulder Exam:  Examination of the left shoulder shows: There is not a deformity. There is not erythema. There is not soft tissue swelling. Deltoid region is  tender to palpation. AC Joint is  tender to palpation. Clavicle is not tender to palpation. Bicipital Groove is not tender to palpation. Pectoralis  is not tender to palpation. Scapula/ trapezius is  tender to palpation. There is  weakness with supraspinatus testing. There is  pain with supraspinatus testing. Yergason Test negative. Drop Arm Test positive. Apprehension Test not tested. Cross Arm Test positive. Supraspinatus Test positive. Shoulder ROM- 180/180/40      Imaging:  Imaging reviewed from outside facility of left shoulder reveals no acute osseous abnormality or DJD. Mri Shoulder Left Wo Contrast    Result Date: 3/15/2019  Reading location:  [facility] Indication: Left shoulder pain, suspected rotator cuff tear. Comparison: None available. Technique: Multiplanar, multisequence MR imaging of the left shoulder was performed without administration of intravenous or intra-articular contrast. FINDINGS: There is mild increased signal within the distal fibers of the supraspinatus tendon, compatible with mild tendinopathy. The infraspinatus, subscapularis, and teres minor tendons are intact. The muscles of the rotator cuff demonstrate normal muscular volume and signal. The articular biceps tendon is within the bicipital groove. The intra-articular biceps tendon is intact. Lack of intra-articular contrast limits evaluation of the labrum. There is increased signal within the anterior labrum with a small paralabral cyst (series 3, image 14), compatible with a tear. The articular cartilage is grossly maintained.  There is a trace amount of joint fluid. There is a trace amount of fluid within the subacromial/subdeltoid bursa. There is periligamentous edema and indistinctness of the inferior glenohumeral ligament, correlate for adhesive capsulitis. There is mild acromioclavicular joint arthrosis. There is no evidence of fracture or osteonecrosis. 1. Mild tendinopathy of the supraspinatus tendon. 2. Suggestion of an anterior labral tear with a small paralabral cyst. 3. Periligamentous edema and indistinctness of the inferior glenohumeral ligament, correlate for adhesive capsulitis. 4. Mild acromioclavicular joint arthrosis with trace subacromial/subdeltoid bursitis. Xr Eye Foreign Body    Result Date: 3/15/2019  Reading location:  [address] Indication: MRI clearance, foreign body in eye. Comparison: None available. Technique: 3 views of the orbits were obtained. Findings: There is no metallic radiopaque foreign body. Visualized paranasal sinuses are well aerated. No metallic radiopaque foreign body is identified. Bharat Riddle was seen today for shoulder pain. Diagnoses and all orders for this visit:    Adhesive capsulitis of left shoulder        Patient seen and examined. X-rays reviewed. Patient has had insidious onset chronic pain and weakness of left shoulder. MRI reviewed today showing signs of rotator cuff tendinopathy but more importantly adhesive capsulitis. Exam was consistent with adhesive capsulitis. Patient was educated about this condition as well as treatment options. On today's follow-up after anyway, patient has had almost complete relief of stiffness with improved abduction and forced flexion. External rotation is still somewhat limited but improved since last visit patient has been very aggressive with HEP and with physical therapy. Continue with current treatment protocol. Follow-up after wrapping up of physical therapy.       Jennifer Escobar DO    .

## 2019-06-12 ENCOUNTER — TREATMENT (OUTPATIENT)
Dept: PHYSICAL THERAPY | Age: 59
End: 2019-06-12
Payer: COMMERCIAL

## 2019-06-12 DIAGNOSIS — M75.02 ADHESIVE CAPSULITIS OF LEFT SHOULDER: Primary | ICD-10-CM

## 2019-06-12 PROCEDURE — 97110 THERAPEUTIC EXERCISES: CPT

## 2019-06-12 PROCEDURE — 97140 MANUAL THERAPY 1/> REGIONS: CPT

## 2019-06-12 NOTE — PROGRESS NOTES
Charges: Mins Units   Initial Evaluation     Re-Evaluation     Ther Exercise         TE 35 2   Manual Therapy     MT 10 1   Ther Activities        TA     Gait Training          GT     Neuro Re-education NR     Modalities     Non-Billable Service Time     Other     Total Time/Units 45 3

## 2019-06-14 ENCOUNTER — TREATMENT (OUTPATIENT)
Dept: PHYSICAL THERAPY | Age: 59
End: 2019-06-14
Payer: COMMERCIAL

## 2019-06-14 DIAGNOSIS — M75.02 ADHESIVE CAPSULITIS OF LEFT SHOULDER: Primary | ICD-10-CM

## 2019-06-14 PROCEDURE — 97140 MANUAL THERAPY 1/> REGIONS: CPT

## 2019-06-14 PROCEDURE — 97110 THERAPEUTIC EXERCISES: CPT

## 2019-06-14 NOTE — PROGRESS NOTES
Physical Therapy Daily Treatment Note    Date: 2019  Patient Name: Enedelia Marie  : 1960   MRN: [id number]  DOInjury: 10/2018 insidious onset  DOSx: LASHAE 5-14-19   Referring Provider: Bhupinder Horn DO  1933 [address]                           Medical Diagnosis:   M75.02 (ICD-10-CM) - Adhesive capsulitis of left shoulder       Outcome Measure:  QuickDASH 36%      S: Pt reports still feeling good. Performing HEP.   O:   Time 0800- 0850 am     Visit  Repeat outcome measure at mid point and end. Pain 3 /10     ROM Left Shoulder:  AROM: 1   AROM: 161* forward elevation, 65* ER, 75* IR    Modalities            Manual      Stretching FE, ER, IR X 10 min  MT         Stretch      Assisted behind back stretch w/ R hand Reviewed  TE   Wall Flexion and wall ABD Reviewed. Recommended patient do at corner to allow full stretch  Pinky along wall for ABD TE   Wall ER stretch Reviewed  TE   Towel IR  Reviewed   TE   S-lying ER stretch  1 x 30 sec for review  TE   S-lying IR stretch  1 x 30 sec for review  TE   Supine hands behind head ER stretch  1 x 30 sec for review  TE   Exercise      Shrugs AROM      Pendulum Ex      UBE      Pulleys - flex      Pulleys-IR      Supine wand chest press  Black 2 x 15 Without pillows  TE   Supine wand flex Black 2 x 15   TE   Supine wand ER/IR Black 2 x 15   TE   Supine flexion      S-lying ABD      S-lying ER      Sitting press ups    TE   Standing wand flex      Standing flexion      Standing ABD            ROWS: H Functional activities                 Gray 2 x 20   To aid in ROM and strength needed for reaching , lifting ,pushing and pulling at home/work    ROWS: Tri Marin 2 x 20 \" TE   ROWS: L Presley 2 x 20 \" TE   ER  Blue 2 x 20 \" TE   IR Blue 2 x 20 \" TE         UBE fwds/bkds  X 3/3  TA   A:  Tolerated well. Newly added strengthening ex's. .   P: Continue with stretching.  Sol Michel PTA    Treatment Charges: Mins Units   Initial Evaluation     Re-Evaluation     Ther Exercise         TE 35 2   Manual Therapy     MT 10 1   Ther Activities        TA     Gait Training          GT     Neuro Re-education NR     Modalities     Non-Billable Service Time     Other     Total Time/Units 45 3

## 2019-06-19 ENCOUNTER — TREATMENT (OUTPATIENT)
Dept: PHYSICAL THERAPY | Age: 59
End: 2019-06-19
Payer: COMMERCIAL

## 2019-06-19 DIAGNOSIS — M75.02 ADHESIVE CAPSULITIS OF LEFT SHOULDER: Primary | ICD-10-CM

## 2019-06-19 PROCEDURE — 97110 THERAPEUTIC EXERCISES: CPT

## 2019-06-19 PROCEDURE — 97140 MANUAL THERAPY 1/> REGIONS: CPT

## 2019-06-19 NOTE — PROGRESS NOTES
Physical Therapy Daily Treatment Note    Date: 2019  Patient Name: Mary Carmen Velásquez  : 1960   MRN: [id number]  DOInjury: 10/2018 insidious onset  DOSx: LASHAE 19   Referring Provider: Kathryn Kaufman DO  1933 [address] Diagnosis:   M75.02 (ICD-10-CM) - Adhesive capsulitis of left shoulder       Outcome Measure:  QuickDASH 36%      S: Pt reports still feeling good. Performing HEP.   O:   Time 8073-0971 am     Visit 10 / 12 Repeat outcome measure at mid point and end. Pain 3 /10     ROM Left Shoulder:  AROM: 1   AROM: 161* forward elevation, 65* ER, 75* IR    Modalities            Manual      Stretching FE, ER, IR X 10 min  MT         Stretch      Assisted behind back stretch w/ R hand Reviewed  TE   Wall Flexion and wall ABD Reviewed. Recommended patient do at corner to allow full stretch  Pinky along wall for ABD TE   Wall ER stretch Reviewed  TE   Towel IR  Reviewed   TE   S-lying ER stretch  1 x 30 sec for review  TE   S-lying IR stretch  1 x 30 sec for review  TE   Supine hands behind head ER stretch  1 x 30 sec for review  TE   Exercise      Shrugs AROM      Pendulum Ex      UBE      Pulleys - flex      Pulleys-IR      Supine wand chest press  Black 2 x 15 Without pillows  TE   Supine wand flex Black 2 x 15   TE   Supine wand ER/IR Black 2 x 15   TE   Supine flexion      S-lying ABD      S-lying ER      Sitting press ups    TE   Standing wand flex      Standing flexion      Standing ABD            ROWS: H Functional activities                 Gray 2 x 20   To aid in ROM and strength needed for reaching , lifting ,pushing and pulling at home/work    ROWS: Theoplis Punches 2 x 20 \" TE   ROWS: L Presley 2 x 20 \" TE   ER  Blue 2 x 20 \" TE   IR Blue 2 x 20 \" TE         Shoulder flexion    TE               UBE fwds/bkds  X 3/3  TA   A:  Tolerated well. P: Continue with stretching.  Magalene Ax, PTA    Treatment Charges: Mins Units Initial Evaluation     Re-Evaluation     Ther Exercise         TE 35 2   Manual Therapy     MT 10 1   Ther Activities        TA     Gait Training          GT     Neuro Re-education NR     Modalities     Non-Billable Service Time     Other     Total Time/Units 45 3

## 2019-06-21 ENCOUNTER — TREATMENT (OUTPATIENT)
Dept: PHYSICAL THERAPY | Age: 59
End: 2019-06-21
Payer: COMMERCIAL

## 2019-06-21 DIAGNOSIS — M75.02 ADHESIVE CAPSULITIS OF LEFT SHOULDER: Primary | ICD-10-CM

## 2019-06-21 PROCEDURE — 97110 THERAPEUTIC EXERCISES: CPT

## 2019-06-21 NOTE — PROGRESS NOTES
Physical Therapy Daily Treatment Note    Date: 2019  Patient Name: Ronald Rey  : 1960   MRN: [id number]  DOInjury: 10/2018 insidious onset  DOSx: LASHAE 5-14-19   Referring Provider: Anisha Grimaldo DO  1933 [address]                           Medical Diagnosis:   M75.02 (ICD-10-CM) - Adhesive capsulitis of left shoulder       Outcome Measure:  QuickDASH 36%      S: Pt reports still feeling good. Performing HEP.   O:   Time 0800- 0835 am     Visit  Repeat outcome measure at mid point and end. Pain 3 /10     ROM Left Shoulder:  AROM: 1   AROM: 161* forward elevation, 65* ER, 75* IR    Modalities            Manual            Stretch      Assisted behind back stretch w/ R hand Reviewed  TE   Wall Flexion and wall ABD Reviewed. Recommended patient do at corner to allow full stretch  Pinky along wall for ABD TE   Wall ER stretch Reviewed  TE   Towel IR  Reviewed   TE   S-lying ER stretch  1 x 30 sec for review  TE   S-lying IR stretch  1 x 30 sec for review  TE   Supine hands behind head ER stretch  1 x 30 sec for review  TE   Exercise      Shrugs AROM      Pendulum Ex      UBE      Pulleys - flex      Pulleys-IR      Supine wand chest press  Black 2 x 15 Without pillows  TE   Supine wand flex Black 2 x 15   TE   Supine wand ER/IR Black 2 x 15   TE   Supine flexion      S-lying ABD      S-lying ER      Sitting press ups    TE   Standing wand flex      Standing flexion      Standing ABD            ROWS: H Functional activities                 Gray 2 x 20   To aid in ROM and strength needed for reaching , lifting ,pushing and pulling at home/work    ROWS: Tish Christine 2 x 20 \" TE   ROWS: L Presley 2 x 20 \" TE   ER  Blue 2 x 20 \" TE   IR Blue 2 x 20 \" TE         Shoulder flexion    TE               UBE fwds/bkds  X 3/3  TA   A:  Tolerated well. P: Continue with POC  X 1 more rx session .   Marylene Mina, PTA    Treatment Charges: Mins Units   Initial Evaluation Re-Evaluation     Ther Exercise         TE 35 2   Manual Therapy     MT     Ther Activities        TA     Gait Training          GT     Neuro Re-education NR     Modalities     Non-Billable Service Time     Other     Total Time/Units 35 2

## 2019-06-28 ENCOUNTER — TREATMENT (OUTPATIENT)
Dept: PHYSICAL THERAPY | Age: 59
End: 2019-06-28
Payer: COMMERCIAL

## 2019-06-28 DIAGNOSIS — M75.02 ADHESIVE CAPSULITIS OF LEFT SHOULDER: Primary | ICD-10-CM

## 2019-06-28 PROCEDURE — 97110 THERAPEUTIC EXERCISES: CPT | Performed by: PHYSICAL THERAPIST

## 2019-06-28 NOTE — PROGRESS NOTES
Physical Therapy Daily Treatment Note    Date: 2019  Patient Name: Miya Rod  : 1960   MRN: [id number]  DOInjury: 10/2018 insidious onset  DOSx: LASHAE 5-14-19   Referring Provider: Jennifer Escobar DO  1933 [address] [facility]                           Medical Diagnosis:   M75.02 (ICD-10-CM) - Adhesive capsulitis of left shoulder       Outcome Measure:  QuickDASH 36%    End Outcome measure = 38%  S: Pt reports still feeling good also continuing to work   Performing HEP.   O:   Time 1290-3552   am     Visit  Repeat outcome measure at mid point and end. Pain 3 /10     ROM Left Shoulder:  AROM: 1   AROM: 161* forward elevation, 65* ER, 75* IR    Modalities            Manual            Stretch      Assisted behind back stretch w/ R hand Reviewed  TE   Wall Flexion and wall ABD Reviewed. Recommended patient do at corner to allow full stretch  Pinky along wall for ABD TE   Wall ER stretch Reviewed  TE   Towel IR  Reviewed   TE   S-lying ER stretch  1 x 30 sec for review  TE   S-lying IR stretch  1 x 30 sec for review  TE   Supine hands behind head ER stretch  1 x 30 sec for review  TE   Exercise      Shrugs AROM      Pendulum Ex      UBE      Pulleys - flex      Pulleys-IR      Supine wand chest press Black 2 x 15 Without pillows  TE   Supine wand flex Black 2 x 15   TE   Supine wand ER/IR Black 2 x 15   TE   Supine flexion      S-lying ABD      S-lying ER      Sitting press ups    pt given t tubing for written HEP  TE   Standing wand flex      Standing flexion      Standing ABD            ROWS: H Functional activities                 Gray 2 x 20   To aid in ROM and strength needed for reaching , lifting ,pushing and pulling at home/work    ROWS: Nestora Ceresco 2 x 20 \" TE   ROWS: L Presley 2 x 20 \" TE   ER  Blue 2 x 20 \" TE   IR Blue 2 x 20 \" TE         Shoulder flexion    TE               UBE fwds/bkds  X 3/3  TA   A:  Tolerated well.      P: Last rx session today, pt to continue with given written HEP. Pt to RTD 7/16/2019 .   Cheryl Nine, PTA    Treatment Charges: Mins Units   Initial Evaluation     Re-Evaluation     Ther Exercise         TE 35 2   Manual Therapy     MT     Ther Activities        TA     Gait Training          GT     Neuro Re-education NR     Modalities     Non-Billable Service Time     Other     Total Time/Units 35 2

## 2019-07-16 ENCOUNTER — OFFICE VISIT (OUTPATIENT)
Dept: ORTHOPEDIC SURGERY | Age: 59
End: 2019-07-16
Payer: COMMERCIAL

## 2019-07-16 VITALS — TEMPERATURE: 98 F | HEIGHT: 74 IN | BODY MASS INDEX: 27.59 KG/M2 | WEIGHT: 215 LBS

## 2019-07-16 DIAGNOSIS — M75.02 ADHESIVE CAPSULITIS OF LEFT SHOULDER: Primary | ICD-10-CM

## 2019-07-16 PROCEDURE — G8419 CALC BMI OUT NRM PARAM NOF/U: HCPCS | Performed by: ORTHOPAEDIC SURGERY

## 2019-07-16 PROCEDURE — 1036F TOBACCO NON-USER: CPT | Performed by: ORTHOPAEDIC SURGERY

## 2019-07-16 PROCEDURE — 99213 OFFICE O/P EST LOW 20 MIN: CPT | Performed by: ORTHOPAEDIC SURGERY

## 2019-07-16 PROCEDURE — 3017F COLORECTAL CA SCREEN DOC REV: CPT | Performed by: ORTHOPAEDIC SURGERY

## 2019-07-16 PROCEDURE — G8427 DOCREV CUR MEDS BY ELIG CLIN: HCPCS | Performed by: ORTHOPAEDIC SURGERY

## 2021-06-30 ENCOUNTER — HOSPITAL ENCOUNTER (OUTPATIENT)
Age: 61
Discharge: HOME | End: 2021-07-02
Payer: COMMERCIAL

## 2021-06-30 ENCOUNTER — HOSPITAL ENCOUNTER (OUTPATIENT)
Dept: GENERAL RADIOLOGY | Age: 61
Discharge: HOME | End: 2021-07-02
Payer: COMMERCIAL

## 2021-06-30 DIAGNOSIS — M25.511 RIGHT SHOULDER PAIN, UNSPECIFIED CHRONICITY: ICD-10-CM

## 2021-06-30 DIAGNOSIS — M79.601 RIGHT UPPER LIMB PAIN: ICD-10-CM

## 2021-06-30 PROCEDURE — 73030 X-RAY EXAM OF SHOULDER: CPT

## 2021-06-30 PROCEDURE — 73060 X-RAY EXAM OF HUMERUS: CPT

## 2021-09-16 ENCOUNTER — OFFICE VISIT (OUTPATIENT)
Dept: ORTHOPEDIC SURGERY | Age: 61
End: 2021-09-16
Payer: COMMERCIAL

## 2021-09-16 VITALS — BODY MASS INDEX: 28.23 KG/M2 | WEIGHT: 220 LBS | TEMPERATURE: 98 F | HEIGHT: 74 IN

## 2021-09-16 DIAGNOSIS — M75.101 TEAR OF RIGHT ROTATOR CUFF, UNSPECIFIED TEAR EXTENT, UNSPECIFIED WHETHER TRAUMATIC: Primary | ICD-10-CM

## 2021-09-16 PROCEDURE — G8419 CALC BMI OUT NRM PARAM NOF/U: HCPCS | Performed by: ORTHOPAEDIC SURGERY

## 2021-09-16 PROCEDURE — 3017F COLORECTAL CA SCREEN DOC REV: CPT | Performed by: ORTHOPAEDIC SURGERY

## 2021-09-16 PROCEDURE — G8427 DOCREV CUR MEDS BY ELIG CLIN: HCPCS | Performed by: ORTHOPAEDIC SURGERY

## 2021-09-16 PROCEDURE — 99213 OFFICE O/P EST LOW 20 MIN: CPT | Performed by: ORTHOPAEDIC SURGERY

## 2021-09-16 PROCEDURE — 4004F PT TOBACCO SCREEN RCVD TLK: CPT | Performed by: ORTHOPAEDIC SURGERY

## 2021-09-16 NOTE — PROGRESS NOTES
Chief Complaint   Patient presents with    Arm Pain     Right Upper arm x 2 months, no known injury, states of pain when throwing a baseball         HPI:    Patient is 57 y.o. male complaining of right shoulder pain and weakness for 2 months. He denies a specific traumatic injury to the right shoulder. Previous treatments include rest, ice, and anti-inflammatory medication and HEP without much relief. He denies any other orthopedic complaints. ROS:    Skin: (-) rash,(-) psoriasis,(-) eczema, (-)skin cancer. Neurologic: (-)numbness, (-)tingling, (-)headaches, (-) LOC. Cardiovascular: (-) Chest pain, (-) swelling in legs/feet, (-) SOB, (-) cramping in legs/feet with walking.     All other review of systems negative except stated above or in HPI      Past Medical History:   Diagnosis Date    Diabetes mellitus ([facility] [address]     Environmental and seasonal allergies     Hyperlipidemia     Shingles      Past Surgical History:   Procedure Laterality Date    CLOSED MANIPULATION SHOULDER Left 5/14/2019    LEFT SHOULDER MANIPULATION UNDER ANESTHESIA performed by Yo Stockton DO at [address] Left 05/14/2019    manipulation        Current Outpatient Medications:     meloxicam (MOBIC) 15 MG tablet, TAKE 1 TABLET BY MOUTH DAILY TAKE WITH FOOD., Disp: 30 tablet, Rfl: 2    SITagliptin (JANUVIA) 25 MG tablet, Take 25 mg by mouth daily, Disp: , Rfl:     rosuvastatin (CRESTOR) 10 MG tablet, Take 10 mg by mouth daily , Disp: , Rfl: 5    metFORMIN (GLUCOPHAGE) 1000 MG tablet, Take 1,000 mg by mouth 2 times daily (with meals), Disp: , Rfl:     montelukast (SINGULAIR) 10 MG tablet, Take 10 mg by mouth nightly, Disp: , Rfl:   No Known Allergies  Social History     Socioeconomic History    Marital status:      Spouse name: Not on file    Number of children: Not on file    Years of education: Not on file    Highest education level: Not on file Occupational History    Not on file   Tobacco Use    Smoking status: Never Smoker    Smokeless tobacco: Never Used   Substance and Sexual Activity    Alcohol use: Yes     Comment: social    Drug use: No    Sexual activity: Yes     Partners: Female   Other Topics Concern    Not on file   Social History Narrative    Not on file     Social Determinants of Health     Financial Resource Strain:     Difficulty of Paying Living Expenses:    Food Insecurity:     Worried About Running Out of Food in the Last Year:     [address] in the Last Year:    Transportation Needs:     Lack of Transportation (Medical):  Lack of Transportation (Non-Medical):    Physical Activity:     Days of Exercise per Week:     Minutes of Exercise per Session:    Stress:     Feeling of Stress :    Social Connections:     Frequency of Communication with Friends and Family:     Frequency of Social Gatherings with Friends and Family:     Attends Christian Services:     Active Member of Clubs or Organizations:     Attends Club or Organization Meetings:     Marital Status:    Intimate Partner Violence:     Fear of Current or Ex-Partner:     Emotionally Abused:     Physically Abused:     Sexually Abused:      No family history on file. Physical Exam:    Temp 98 °F (36.7 °C)   Ht 6' 2\" (1.88 m)   Wt 220 lb (99.8 kg)   BMI 28.25 kg/m²     GENERAL: alert, appears stated age, cooperative, no acute distress    HEENT: Head is normocephalic, atraumatic. PERRLA. SKIN: Clean, dry, intact. There is not any cellulitis or cutaneous lesions noted in the upper extremities    PULMONARY: breathing is regular and unlabored, no acute distress    CV: The bilateral upper and lower extremities are warm and well-perfused with brisk capillary refill. 2+ pulses UE and LE bilateral.     PSYCHIATRY: Pleasant mood, appropriate behavior, follows commands    NEURO: Sensation is intact distally with light touch with no alteration.  Motor exam of the upper extremities show elbow flexion and extension, wrist flexion and extension, and finger abduction grossly intact 5/5. Upper extremity reflexes are bilaterally symmetrical and within normal limits. LYMPH: No lymphedema present distally in upper or lower extremity. MUSCULOSKELETAL:  Shoulder Exam:  Examination of the right shoulder shows: There is not a deformity. There is not erythema. There is not soft tissue swelling. Deltoid region is  tender to palpation. AC Joint is  tender to palpation. Clavicle is not tender to palpation. Bicipital Groove is  tender to palpation. Pectoralis  is not tender to palpation. Scapula/ trapezius is  tender to palpation.   Left:  ROM Full, Strength: Supraspinatus 5/5, Infraspinatus 5/5, Subscapularis 5/5  Right:  /140/55, Strength: Supraspinatus 4/5, Infraspinatus 5/5, Subscapularis 5/5  Left Shoulder:  Crepitus:  no   Tenderness:  none   Effusion:   none   Impingement: negative   Empty Can:  negative   Speed's:  negative      Apprehension:  negative   Cross Arm Sign:  negative   Isleta's:  negative       Neer's:  negative      Belly Press Test:  negative      Drop Arm Test:  negative     Right Shoulder:  Crepitus:  no   Tenderness:  mild   Effusion:   non   Impingement: positive   Empty Can:  positive   Speed's: positive   Apprehension:  not tested   Cross Arm Sign:  positive   Isleta's:  positive      Neer's:  positive      Belly Press Test:  negative   Drop Arm Test:  positive           Imaging:  EXAMINATION:   THREE XRAY VIEWS OF THE RIGHT SHOULDER       6/30/2021 6:35 pm       COMPARISON:   None.       HISTORY:   ORDERING SYSTEM PROVIDED HISTORY: Right shoulder pain, unspecified chronicity       FINDINGS:   Glenohumeral joint is normally aligned.  No evidence of acute fracture or   dislocation.  No abnormal periarticular calcifications.  AC joint   degenerative changes.       Visualized lung is unremarkable.           Impression   No acute abnormality.       AC joint degenerative changes. Sherri Sheehan was seen today for arm pain. Diagnoses and all orders for this visit:    Tear of right rotator cuff, unspecified tear extent, unspecified whether traumatic  -     MRI SHOULDER RIGHT WO CONTRAST; Future        Patient seen and examined. X-rays reviewed. Patient has exam and history consistent with rotator cuff pathology. MRI recommended for further evaluation and management of possible rotator cuff tear. Return to clinic after MRI      In a 15 minute assessment and discussion, patient was counseled on continued weight loss, diet, and physical activity relating to this condition. He was educated with options in detail including nutrition, joining a health club/ weight loss program, and use of cardio equipment such as the Arc Trainer and the importance of use as well as range of motion and HEP exercises for weight loss.      Fred Gonzales,   9/16/21

## 2021-11-01 NOTE — PROGRESS NOTES
Chief Complaint   Patient presents with    Shoulder Pain     right shoulder MRI Results still having pain no improvement         HPI:    Patient is 57 y.o. male complaining of right shoulder pain and weakness for 2 months. He denies a specific traumatic injury to the right shoulder. Previous treatments include rest, ice, and anti-inflammatory medication and HEP without much relief. He denies any other orthopedic complaints. Follows up after MRI. ROS:    Skin: (-) rash,(-) psoriasis,(-) eczema, (-)skin cancer. Neurologic: (-)numbness, (-)tingling, (-)headaches, (-) LOC. Cardiovascular: (-) Chest pain, (-) swelling in legs/feet, (-) SOB, (-) cramping in legs/feet with walking.     All other review of systems negative except stated above or in HPI      Past Medical History:   Diagnosis Date    Diabetes mellitus ([facility] [address]     Environmental and seasonal allergies     Hyperlipidemia     Shingles      Past Surgical History:   Procedure Laterality Date    CLOSED MANIPULATION SHOULDER Left 5/14/2019    LEFT SHOULDER MANIPULATION UNDER ANESTHESIA performed by Claudetta Goodie, DO at [address] Left 05/14/2019    manipulation        Current Outpatient Medications:     glimepiride (AMARYL) 2 MG tablet, , Disp: , Rfl:     TRUE METRIX BLOOD GLUCOSE TEST strip, , Disp: , Rfl:     TRUEplus Lancets 33G MISC, , Disp: , Rfl:     JANUVIA 50 MG tablet, , Disp: , Rfl:     meloxicam (MOBIC) 15 MG tablet, TAKE 1 TABLET BY MOUTH DAILY TAKE WITH FOOD., Disp: 30 tablet, Rfl: 2    SITagliptin (JANUVIA) 25 MG tablet, Take 25 mg by mouth daily, Disp: , Rfl:     rosuvastatin (CRESTOR) 10 MG tablet, Take 10 mg by mouth daily , Disp: , Rfl: 5    metFORMIN (GLUCOPHAGE) 1000 MG tablet, Take 1,000 mg by mouth 2 times daily (with meals), Disp: , Rfl:     montelukast (SINGULAIR) 10 MG tablet, Take 10 mg by mouth nightly, Disp: , Rfl:   No Known Allergies  Social History     Socioeconomic History    Marital status:      Spouse name: Not on file    Number of children: Not on file    Years of education: Not on file    Highest education level: Not on file   Occupational History    Not on file   Tobacco Use    Smoking status: Never Smoker    Smokeless tobacco: Never Used   Substance and Sexual Activity    Alcohol use: Yes     Comment: social    Drug use: No    Sexual activity: Yes     Partners: Female   Other Topics Concern    Not on file   Social History Narrative    Not on file     Social Determinants of Health     Financial Resource Strain:     Difficulty of Paying Living Expenses:    Food Insecurity:     Worried About Running Out of Food in the Last Year:     [address] in the Last Year:    Transportation Needs:     Lack of Transportation (Medical):  Lack of Transportation (Non-Medical):    Physical Activity:     Days of Exercise per Week:     Minutes of Exercise per Session:    Stress:     Feeling of Stress :    Social Connections:     Frequency of Communication with Friends and Family:     Frequency of Social Gatherings with Friends and Family:     Attends [employer] Services:     Active Member of Clubs or Organizations:     Attends Club or Organization Meetings:     Marital Status:    Intimate Partner Violence:     Fear of Current or Ex-Partner:     Emotionally Abused:     Physically Abused:     Sexually Abused:      No family history on file. Physical Exam:    Temp 98 °F (36.7 °C)   Ht 6' 2\" (1.88 m)   Wt 225 lb (102.1 kg)   BMI 28.89 kg/m²     GENERAL: alert, appears stated age, cooperative, no acute distress    HEENT: Head is normocephalic, atraumatic. PERRLA. SKIN: Clean, dry, intact. There is not any cellulitis or cutaneous lesions noted in the upper extremities    PULMONARY: breathing is regular and unlabored, no acute distress    CV:  The bilateral upper and lower extremities are warm and well-perfused with brisk capillary refill. 2+ pulses UE and LE bilateral.     PSYCHIATRY: Pleasant mood, appropriate behavior, follows commands    NEURO: Sensation is intact distally with light touch with no alteration. Motor exam of the upper extremities show elbow flexion and extension, wrist flexion and extension, and finger abduction grossly intact 5/5. Upper extremity reflexes are bilaterally symmetrical and within normal limits. LYMPH: No lymphedema present distally in upper or lower extremity. MUSCULOSKELETAL:  Shoulder Exam:  Examination of the right shoulder shows: There is not a deformity. There is not erythema. There is not soft tissue swelling. Deltoid region is  tender to palpation. AC Joint is  tender to palpation. Clavicle is not tender to palpation. Bicipital Groove is  tender to palpation. Pectoralis  is not tender to palpation. Scapula/ trapezius is  tender to palpation. Left:  ROM Full, Strength: Supraspinatus 5/5, Infraspinatus 5/5, Subscapularis 5/5  Right:  /140/45, Strength: Supraspinatus 4/5, Infraspinatus 5/5, Subscapularis 5/5  Left Shoulder:  Crepitus:  no   Tenderness:  none   Effusion:   none   Impingement: negative   Empty Can:  negative   Speed's:  negative      Apprehension:  negative   Cross Arm Sign:  negative   Xenia's:  negative       Neer's:  negative      Belly Press Test:  negative      Drop Arm Test:  negative     Right Shoulder:  Crepitus:  no   Tenderness:  mild   Effusion:   non   Impingement: positive   Empty Can:  positive   Speed's: positive   Apprehension:  not tested   Cross Arm Sign:  positive   Xenia's:  positive      Neer's:  positive      Belly Press Test:  negative   Drop Arm Test:  positive       no change since last visit.     Imaging:  EXAMINATION:   THREE XRAY VIEWS OF THE RIGHT SHOULDER       6/30/2021 6:35 pm       COMPARISON:   None.       HISTORY:   ORDERING SYSTEM PROVIDED HISTORY: Right shoulder pain, unspecified chronicity       FINDINGS: Glenohumeral joint is normally aligned.  No evidence of acute fracture or   dislocation.  No abnormal periarticular calcifications.  AC joint   degenerative changes.       Visualized lung is unremarkable.           Impression   No acute abnormality.       AC joint degenerative changes. MRI SHOULDER RIGHT WO CONTRAST    Result Date: 10/28/2021  EXAMINATION: MRI OF THE RIGHT SHOULDER WITHOUT CONTRAST   10/27/2021 9:48 am TECHNIQUE: Multiplanar multisequence MRI of the right shoulder was performed without the administration of intravenous contrast. COMPARISON: Right shoulder x-rays 06/30/2021 HISTORY: ORDERING SYSTEM PROVIDED HISTORY: Tear of right rotator cuff, unspecified tear extent, unspecified whether traumatic FINDINGS: ROTATOR CUFF: Low-grade partial-thickness bursal-surface tear involving supraspinatus tendon mid fibers in region of critical zone with interstitial extension extending to attachment on greater tuberosity (best appreciated image 12, coronal fat-suppressed T2-weighted sequence, series 5). Remainder of the rotator cuff appears intact. Mild superimposed tendinosis to the supraspinatus as well as the infraspinatus and subscapularis tendons. Muscle mass and muscle signal intensities are maintained. BICEPS TENDON: Long head biceps tendon appears intact, appropriately located and normal in appearance. Mild tenosynovitis. LABRUM: Degeneration and tearing of the inferior labrum. No paralabral cyst. GLENOHUMERAL JOINT: No degenerative changes noted. No joint effusion or intra-articular loose bodies. Thickening and intrinsic T2 hyperintensity of the inferior capsuloligamentous structures of the glenohumeral joint without focal discontinuity noted. Minimal pericapsular edema inferiorly. AC JOINT AND ACROMIOCLAVICULAR ARCH: Mild degenerative changes to the [facility] joint. No findings for [facility] joint separation. Acromiohumeral interval is within normal limits.   No significant fluid in the subacromial/subdeltoid bursa. BONE MARROW: No evidence for occult fracture. No suspicious focal bony lesions. OUTLET SPACES: No focal abnormalities are seen in the outlet spaces. Low-grade partial-thickness bursal-surface tear of supraspinatus tendon with interstitial extension extending distally towards greater tuberosity attachment. Mild superimposed tendinosis to the tendon as well as to the infraspinatus and subscapularis tendons. Degeneration and tearing of the inferior labrum without paralabral cyst noted. Thickening and abnormal intrinsic signal change to the inferior capsuloligamentous structures of the glenohumeral joint without focal discontinuity noted. Minimal adjacent pericapsular edema. Findings are nonspecific but can be seen in the setting of adhesive capsulitis. Mild tenosynovitis to the long head biceps tendon. Luke Treviño was seen today for shoulder pain. Diagnoses and all orders for this visit:    Adhesive capsulitis of right shoulder    Exercise counseling        Patient seen and examined. X-rays reviewed. Patient has exam and history consistent with rotator cuff pathology. MRI recommended for further evaluation and management of possible rotator cuff tear. Patient seen and examined. MRI reviewed with patient in detail. Natural history and course discussed with patient in long discussion  Treatment options discussed with patient in detail including risks and benefits. I will perform an LASHAE of the right shoulder. The risks and benefits were discussed with the patient. The risks include but are not limited to: intraoperative fracture, injuries to blood vessels and nerves, non relief of symptoms, PE/DVT, MI and death. The patient understands these risks and wishes to proceed with surgery. The patient was counseled at length about the risks of angelica Covid-19 during their perioperative period and any recovery window from their procedure.   The

## 2021-11-02 ENCOUNTER — HOSPITAL ENCOUNTER (OUTPATIENT)
Age: 61
Discharge: HOME | End: 2021-11-02
Payer: COMMERCIAL

## 2021-11-02 ENCOUNTER — OFFICE VISIT (OUTPATIENT)
Dept: ORTHOPEDIC SURGERY | Age: 61
End: 2021-11-02
Payer: COMMERCIAL

## 2021-11-02 VITALS — HEIGHT: 74 IN | WEIGHT: 225 LBS | BODY MASS INDEX: 28.88 KG/M2 | TEMPERATURE: 98 F

## 2021-11-02 DIAGNOSIS — M75.01 ADHESIVE CAPSULITIS OF RIGHT SHOULDER: Primary | ICD-10-CM

## 2021-11-02 DIAGNOSIS — Z71.82 EXERCISE COUNSELING: ICD-10-CM

## 2021-11-02 LAB
ANION GAP SERPL CALCULATED.3IONS-SCNC: 8 MMOL/L (ref 7–16)
BASOPHILS ABSOLUTE: 0.05 E9/L (ref 0–0.2)
BASOPHILS RELATIVE PERCENT: 0.7 % (ref 0–2)
BUN BLDV-MCNC: 13 MG/DL (ref 6–23)
CALCIUM SERPL-MCNC: 9 MG/DL (ref 8.6–10.2)
CHLORIDE BLD-SCNC: 104 MMOL/L (ref 98–107)
CO2: 28 MMOL/L (ref 22–29)
CREAT SERPL-MCNC: 0.8 MG/DL (ref 0.7–1.2)
EKG ATRIAL RATE: 71 BPM
EKG P AXIS: 51 DEGREES
EKG P-R INTERVAL: 142 MS
EKG Q-T INTERVAL: 416 MS
EKG QRS DURATION: 110 MS
EKG QTC CALCULATION (BAZETT): 452 MS
EKG R AXIS: 9 DEGREES
EKG T AXIS: 21 DEGREES
EKG VENTRICULAR RATE: 71 BPM
EOSINOPHILS ABSOLUTE: 0.1 E9/L (ref 0.05–0.5)
EOSINOPHILS RELATIVE PERCENT: 1.4 % (ref 0–6)
GFR AFRICAN AMERICAN: >60
GFR NON-AFRICAN AMERICAN: >60 ML/MIN/1.73
GLUCOSE BLD-MCNC: 115 MG/DL (ref 74–99)
HCT VFR BLD CALC: 39.4 % (ref 37–54)
HEMOGLOBIN: 13.8 G/DL (ref 12.5–16.5)
IMMATURE GRANULOCYTES #: 0.03 E9/L
IMMATURE GRANULOCYTES %: 0.4 % (ref 0–5)
LYMPHOCYTES ABSOLUTE: 1.85 E9/L (ref 1.5–4)
LYMPHOCYTES RELATIVE PERCENT: 26.4 % (ref 20–42)
MCH RBC QN AUTO: 29.3 PG (ref 26–35)
MCHC RBC AUTO-ENTMCNC: 35 % (ref 32–34.5)
MCV RBC AUTO: 83.7 FL (ref 80–99.9)
MONOCYTES ABSOLUTE: 0.59 E9/L (ref 0.1–0.95)
MONOCYTES RELATIVE PERCENT: 8.4 % (ref 2–12)
NEUTROPHILS ABSOLUTE: 4.38 E9/L (ref 1.8–7.3)
NEUTROPHILS RELATIVE PERCENT: 62.7 % (ref 43–80)
PDW BLD-RTO: 12.1 FL (ref 11.5–15)
PLATELET # BLD: 133 E9/L (ref 130–450)
PMV BLD AUTO: 9.9 FL (ref 7–12)
POTASSIUM SERPL-SCNC: 4.5 MMOL/L (ref 3.5–5)
RBC # BLD: 4.71 E12/L (ref 3.8–5.8)
RBC # BLD: NORMAL 10*6/UL
SODIUM BLD-SCNC: 140 MMOL/L (ref 132–146)
WBC # BLD: 7 E9/L (ref 4.5–11.5)

## 2021-11-02 PROCEDURE — G8484 FLU IMMUNIZE NO ADMIN: HCPCS | Performed by: ORTHOPAEDIC SURGERY

## 2021-11-02 PROCEDURE — G8419 CALC BMI OUT NRM PARAM NOF/U: HCPCS | Performed by: ORTHOPAEDIC SURGERY

## 2021-11-02 PROCEDURE — 3017F COLORECTAL CA SCREEN DOC REV: CPT | Performed by: ORTHOPAEDIC SURGERY

## 2021-11-02 PROCEDURE — 80048 BASIC METABOLIC PNL TOTAL CA: CPT

## 2021-11-02 PROCEDURE — 99214 OFFICE O/P EST MOD 30 MIN: CPT | Performed by: ORTHOPAEDIC SURGERY

## 2021-11-02 PROCEDURE — G8427 DOCREV CUR MEDS BY ELIG CLIN: HCPCS | Performed by: ORTHOPAEDIC SURGERY

## 2021-11-02 PROCEDURE — 36415 COLL VENOUS BLD VENIPUNCTURE: CPT

## 2021-11-02 PROCEDURE — 85025 COMPLETE CBC W/AUTO DIFF WBC: CPT

## 2021-11-02 PROCEDURE — 93005 ELECTROCARDIOGRAM TRACING: CPT | Performed by: GENERAL PRACTICE

## 2021-11-02 PROCEDURE — 4004F PT TOBACCO SCREEN RCVD TLK: CPT | Performed by: ORTHOPAEDIC SURGERY

## 2021-11-02 PROCEDURE — 93010 ELECTROCARDIOGRAM REPORT: CPT | Performed by: INTERNAL MEDICINE

## 2021-11-02 RX ORDER — GLIMEPIRIDE 2 MG/1
TABLET ORAL
COMMUNITY
Start: 2021-09-22

## 2021-11-02 RX ORDER — GLUCOSAM/CHON-MSM1/C/MANG/BOSW 500-416.6
TABLET ORAL
COMMUNITY
Start: 2021-10-20

## 2021-11-02 RX ORDER — SITAGLIPTIN 50 MG/1
TABLET, FILM COATED ORAL
COMMUNITY
Start: 2021-09-30 | End: 2021-11-03 | Stop reason: ALTCHOICE

## 2021-11-02 RX ORDER — CALCIUM CITRATE/VITAMIN D3 200MG-6.25
TABLET ORAL
COMMUNITY
Start: 2021-10-21

## 2021-11-03 RX ORDER — CHLORAL HYDRATE 500 MG
1000 CAPSULE ORAL 2 TIMES DAILY
COMMUNITY

## 2021-11-08 ENCOUNTER — ANESTHESIA EVENT (OUTPATIENT)
Dept: OPERATING ROOM | Age: 61
End: 2021-11-08
Payer: COMMERCIAL

## 2021-11-08 NOTE — ANESTHESIA PRE PROCEDURE
Department of Anesthesiology  Preprocedure Note       Name:  Mariam Keller   Age:  57 y.o.  :  1960                                          MRN:  [id number]         Date:  2021      Surgeon: Mary Shi): Juan Carlos Álvarez DO    Procedure: Procedure(s):  RIGHT SHOULDER MANIPULATION UNDER ANESTHESIA    Medications prior to admission:   Prior to Admission medications    Medication Sig Start Date End Date Taking? Authorizing Provider   Omega-3 Fatty Acids (FISH OIL) 1000 MG CAPS Take 1,000 mg by mouth 2 times daily   Yes Historical Provider, MD   SITagliptin (JANUVIA) 25 MG tablet Take 25 mg by mouth daily   Yes Historical Provider, MD   rosuvastatin (CRESTOR) 10 MG tablet Take 10 mg by mouth daily  19  Yes Historical Provider, MD   metFORMIN (GLUCOPHAGE) 1000 MG tablet Take 1,000 mg by mouth 2 times daily (with meals)   Yes Historical Provider, MD   montelukast (SINGULAIR) 10 MG tablet Take 10 mg by mouth nightly   Yes Historical Provider, MD   glimepiride (AMARYL) 2 MG tablet Take by mouth every morning (before breakfast)  21   Historical Provider, MD   TRUE METRIX BLOOD GLUCOSE TEST strip  10/21/21   Historical Provider, MD   TRUEplus Lancets 33G [facility]  10/20/21   Historical Provider, MD       Current medications:    No current facility-administered medications for this encounter.      Current Outpatient Medications   Medication Sig Dispense Refill    Omega-3 Fatty Acids (FISH OIL) 1000 MG CAPS Take 1,000 mg by mouth 2 times daily      SITagliptin (JANUVIA) 25 MG tablet Take 25 mg by mouth daily      rosuvastatin (CRESTOR) 10 MG tablet Take 10 mg by mouth daily   5    metFORMIN (GLUCOPHAGE) 1000 MG tablet Take 1,000 mg by mouth 2 times daily (with meals)      montelukast (SINGULAIR) 10 MG tablet Take 10 mg by mouth nightly      glimepiride (AMARYL) 2 MG tablet Take by mouth every morning (before breakfast)       TRUE METRIX BLOOD GLUCOSE TEST strip       TRUEplus Lancets 33G MISC Allergies: Allergies   Allergen Reactions    Other      Canteloupe gives him hives       Problem List:    Patient Active Problem List   Diagnosis Code    Adhesive capsulitis of left shoulder M75.02       Past Medical History:        Diagnosis Date    Diabetes mellitus ([address]     Environmental and seasonal allergies     Hyperlipidemia     Shingles        Past Surgical History:        Procedure Laterality Date    CLOSED MANIPULATION SHOULDER Left 5/14/2019    LEFT SHOULDER MANIPULATION UNDER ANESTHESIA performed by Kamaljit Simpson DO at [address] [place] [address]    SHOULDER SURGERY Left 05/14/2019    manipulation        Social History:    Social History     Tobacco Use    Smoking status: Never Smoker    Smokeless tobacco: Never Used   Substance Use Topics    Alcohol use: Yes     Comment: social                                Counseling given: Not Answered      Vital Signs (Current):   Vitals:    11/03/21 1506   Weight: 229 lb (103.9 kg)   Height: 6' 2\" (1.88 m)                                              BP Readings from Last 3 Encounters:   05/14/19 97/66   05/14/19 (!) 150/90   12/03/16 132/84       NPO Status:                                                                                 BMI:   Wt Readings from Last 3 Encounters:   11/02/21 225 lb (102.1 kg)   09/16/21 220 lb (99.8 kg)   07/16/19 215 lb (97.5 kg)     Body mass index is 29.4 kg/m².     CBC:   Lab Results   Component Value Date    WBC 7.0 11/02/2021    RBC 4.71 11/02/2021    HGB 13.8 11/02/2021    HCT 39.4 11/02/2021    MCV 83.7 11/02/2021    RDW 12.1 11/02/2021     11/02/2021       CMP:   Lab Results   Component Value Date     11/02/2021    K 4.5 11/02/2021     11/02/2021    CO2 28 11/02/2021    BUN 13 11/02/2021    CREATININE 0.8 11/02/2021    GFRAA >60 11/02/2021    LABGLOM >60 11/02/2021    GLUCOSE 115 11/02/2021    PROT 6.9 07/16/2017    CALCIUM 9.0 medication and allergy history. Confirmation of above and final disposition per DOS anesthesiologist.        Neville Bell MD   11/8/2021        ------DOS anesthesiologist addendum-------  Patient seen and evaluated. Plan for general anesthetic discussed with patient. All patient's questions were answered to his satisfaction. Patient consents to and agrees to general anesthesia.   Ursula Lemons MD  11/9/21

## 2021-11-09 ENCOUNTER — EVALUATION (OUTPATIENT)
Dept: PHYSICAL THERAPY | Age: 61
End: 2021-11-09
Payer: COMMERCIAL

## 2021-11-09 ENCOUNTER — HOSPITAL ENCOUNTER (OUTPATIENT)
Age: 61
Setting detail: OUTPATIENT SURGERY
Discharge: HOME | End: 2021-11-09
Attending: ORTHOPAEDIC SURGERY | Admitting: ORTHOPAEDIC SURGERY
Payer: COMMERCIAL

## 2021-11-09 ENCOUNTER — ANESTHESIA (OUTPATIENT)
Dept: OPERATING ROOM | Age: 61
End: 2021-11-09
Payer: COMMERCIAL

## 2021-11-09 VITALS
RESPIRATION RATE: 16 BRPM | SYSTOLIC BLOOD PRESSURE: 132 MMHG | DIASTOLIC BLOOD PRESSURE: 81 MMHG | HEART RATE: 72 BPM | WEIGHT: 223 LBS | HEIGHT: 74 IN | BODY MASS INDEX: 28.62 KG/M2 | TEMPERATURE: 97 F | OXYGEN SATURATION: 98 %

## 2021-11-09 VITALS
SYSTOLIC BLOOD PRESSURE: 121 MMHG | OXYGEN SATURATION: 98 % | RESPIRATION RATE: 14 BRPM | DIASTOLIC BLOOD PRESSURE: 82 MMHG

## 2021-11-09 DIAGNOSIS — M75.01 ADHESIVE CAPSULITIS OF RIGHT SHOULDER: Primary | ICD-10-CM

## 2021-11-09 DIAGNOSIS — M75.02 ADHESIVE CAPSULITIS OF LEFT SHOULDER: Primary | ICD-10-CM

## 2021-11-09 LAB — METER GLUCOSE: 126 MG/DL (ref 74–99)

## 2021-11-09 PROCEDURE — 97161 PT EVAL LOW COMPLEX 20 MIN: CPT | Performed by: PHYSICAL THERAPIST

## 2021-11-09 PROCEDURE — 2500000003 HC RX 250 WO HCPCS: Performed by: NURSE ANESTHETIST, CERTIFIED REGISTERED

## 2021-11-09 PROCEDURE — 3700000001 HC ADD 15 MINUTES (ANESTHESIA): Performed by: ORTHOPAEDIC SURGERY

## 2021-11-09 PROCEDURE — 6360000002 HC RX W HCPCS: Performed by: NURSE ANESTHETIST, CERTIFIED REGISTERED

## 2021-11-09 PROCEDURE — 3700000000 HC ANESTHESIA ATTENDED CARE: Performed by: ORTHOPAEDIC SURGERY

## 2021-11-09 PROCEDURE — 3600000002 HC SURGERY LEVEL 2 BASE: Performed by: ORTHOPAEDIC SURGERY

## 2021-11-09 PROCEDURE — 82962 GLUCOSE BLOOD TEST: CPT | Performed by: ORTHOPAEDIC SURGERY

## 2021-11-09 PROCEDURE — 23700 MNPJ ANES SHO JT FIXJ APRATS: CPT | Performed by: ORTHOPAEDIC SURGERY

## 2021-11-09 PROCEDURE — 7100000010 HC PHASE II RECOVERY - FIRST 15 MIN: Performed by: ORTHOPAEDIC SURGERY

## 2021-11-09 PROCEDURE — 2709999900 HC NON-CHARGEABLE SUPPLY: Performed by: ORTHOPAEDIC SURGERY

## 2021-11-09 PROCEDURE — 2580000003 HC RX 258: Performed by: ANESTHESIOLOGY

## 2021-11-09 PROCEDURE — 3600000012 HC SURGERY LEVEL 2 ADDTL 15MIN: Performed by: ORTHOPAEDIC SURGERY

## 2021-11-09 PROCEDURE — 7100000011 HC PHASE II RECOVERY - ADDTL 15 MIN: Performed by: ORTHOPAEDIC SURGERY

## 2021-11-09 PROCEDURE — 82962 GLUCOSE BLOOD TEST: CPT

## 2021-11-09 PROCEDURE — 7100000000 HC PACU RECOVERY - FIRST 15 MIN: Performed by: ORTHOPAEDIC SURGERY

## 2021-11-09 PROCEDURE — 6370000000 HC RX 637 (ALT 250 FOR IP)

## 2021-11-09 PROCEDURE — 7100000001 HC PACU RECOVERY - ADDTL 15 MIN: Performed by: ORTHOPAEDIC SURGERY

## 2021-11-09 RX ORDER — HYDROCODONE BITARTRATE AND ACETAMINOPHEN 5; 325 MG/1; MG/1
1 TABLET ORAL ONCE
Status: COMPLETED | OUTPATIENT
Start: 2021-11-09 | End: 2021-11-09

## 2021-11-09 RX ORDER — NAPROXEN 500 MG/1
500 TABLET ORAL 2 TIMES DAILY WITH MEALS
Qty: 60 TABLET | Refills: 0 | Status: SHIPPED | OUTPATIENT
Start: 2021-11-09 | End: 2021-12-29 | Stop reason: SDUPTHER

## 2021-11-09 RX ORDER — MIDAZOLAM HYDROCHLORIDE 1 MG/ML
INJECTION INTRAMUSCULAR; INTRAVENOUS PRN
Status: DISCONTINUED | OUTPATIENT
Start: 2021-11-09 | End: 2021-11-09 | Stop reason: SDUPTHER

## 2021-11-09 RX ORDER — DEXAMETHASONE SODIUM PHOSPHATE 10 MG/ML
INJECTION, SOLUTION INTRAMUSCULAR; INTRAVENOUS PRN
Status: DISCONTINUED | OUTPATIENT
Start: 2021-11-09 | End: 2021-11-09 | Stop reason: SDUPTHER

## 2021-11-09 RX ORDER — FENTANYL CITRATE 50 UG/ML
INJECTION, SOLUTION INTRAMUSCULAR; INTRAVENOUS PRN
Status: DISCONTINUED | OUTPATIENT
Start: 2021-11-09 | End: 2021-11-09 | Stop reason: SDUPTHER

## 2021-11-09 RX ORDER — NALOXONE HYDROCHLORIDE 0.4 MG/ML
INJECTION, SOLUTION INTRAMUSCULAR; INTRAVENOUS; SUBCUTANEOUS PRN
Status: DISCONTINUED | OUTPATIENT
Start: 2021-11-09 | End: 2021-11-09 | Stop reason: SDUPTHER

## 2021-11-09 RX ORDER — SODIUM CHLORIDE, SODIUM LACTATE, POTASSIUM CHLORIDE, CALCIUM CHLORIDE 600; 310; 30; 20 MG/100ML; MG/100ML; MG/100ML; MG/100ML
INJECTION, SOLUTION INTRAVENOUS CONTINUOUS
Status: DISCONTINUED | OUTPATIENT
Start: 2021-11-09 | End: 2021-11-09 | Stop reason: HOSPADM

## 2021-11-09 RX ORDER — PROPOFOL 10 MG/ML
INJECTION, EMULSION INTRAVENOUS PRN
Status: DISCONTINUED | OUTPATIENT
Start: 2021-11-09 | End: 2021-11-09 | Stop reason: SDUPTHER

## 2021-11-09 RX ORDER — HYDROCODONE BITARTRATE AND ACETAMINOPHEN 5; 325 MG/1; MG/1
1 TABLET ORAL EVERY 8 HOURS PRN
Qty: 21 TABLET | Refills: 0 | Status: SHIPPED | OUTPATIENT
Start: 2021-11-09 | End: 2021-11-16

## 2021-11-09 RX ORDER — HYDROCODONE BITARTRATE AND ACETAMINOPHEN 5; 325 MG/1; MG/1
TABLET ORAL
Status: COMPLETED
Start: 2021-11-09 | End: 2021-11-09

## 2021-11-09 RX ORDER — ONDANSETRON 2 MG/ML
INJECTION INTRAMUSCULAR; INTRAVENOUS PRN
Status: DISCONTINUED | OUTPATIENT
Start: 2021-11-09 | End: 2021-11-09 | Stop reason: SDUPTHER

## 2021-11-09 RX ORDER — GLYCOPYRROLATE 1 MG/5 ML
SYRINGE (ML) INTRAVENOUS PRN
Status: DISCONTINUED | OUTPATIENT
Start: 2021-11-09 | End: 2021-11-09 | Stop reason: SDUPTHER

## 2021-11-09 RX ORDER — KETOROLAC TROMETHAMINE 30 MG/ML
INJECTION, SOLUTION INTRAMUSCULAR; INTRAVENOUS PRN
Status: DISCONTINUED | OUTPATIENT
Start: 2021-11-09 | End: 2021-11-09 | Stop reason: SDUPTHER

## 2021-11-09 RX ADMIN — HYDROCODONE BITARTRATE AND ACETAMINOPHEN 1 TABLET: 5; 325 TABLET ORAL at 10:30

## 2021-11-09 RX ADMIN — FENTANYL CITRATE 50 MCG: 50 INJECTION, SOLUTION INTRAMUSCULAR; INTRAVENOUS at 09:37

## 2021-11-09 RX ADMIN — PROPOFOL 50 MG: 10 INJECTION, EMULSION INTRAVENOUS at 09:38

## 2021-11-09 RX ADMIN — MIDAZOLAM 2 MG: 1 INJECTION INTRAMUSCULAR; INTRAVENOUS at 09:29

## 2021-11-09 RX ADMIN — SODIUM CHLORIDE, POTASSIUM CHLORIDE, SODIUM LACTATE AND CALCIUM CHLORIDE: 600; 310; 30; 20 INJECTION, SOLUTION INTRAVENOUS at 09:05

## 2021-11-09 RX ADMIN — ONDANSETRON 4 MG: 2 INJECTION INTRAMUSCULAR; INTRAVENOUS at 09:42

## 2021-11-09 RX ADMIN — DEXAMETHASONE SODIUM PHOSPHATE 10 MG: 10 INJECTION, SOLUTION INTRAMUSCULAR; INTRAVENOUS at 09:42

## 2021-11-09 RX ADMIN — FENTANYL CITRATE 100 MCG: 50 INJECTION, SOLUTION INTRAMUSCULAR; INTRAVENOUS at 09:32

## 2021-11-09 RX ADMIN — KETOROLAC TROMETHAMINE 30 MG: 30 INJECTION, SOLUTION INTRAMUSCULAR; INTRAVENOUS at 09:42

## 2021-11-09 RX ADMIN — NALOXONE HYDROCHLORIDE 0.04 MG: 0.4 INJECTION, SOLUTION INTRAMUSCULAR; INTRAVENOUS; SUBCUTANEOUS at 09:46

## 2021-11-09 RX ADMIN — Medication 0.2 MG: at 09:32

## 2021-11-09 RX ADMIN — PROPOFOL 200 MG: 10 INJECTION, EMULSION INTRAVENOUS at 09:32

## 2021-11-09 RX ADMIN — FENTANYL CITRATE 50 MCG: 50 INJECTION, SOLUTION INTRAMUSCULAR; INTRAVENOUS at 09:40

## 2021-11-09 ASSESSMENT — PAIN - FUNCTIONAL ASSESSMENT
PAIN_FUNCTIONAL_ASSESSMENT: 0-10
PAIN_FUNCTIONAL_ASSESSMENT: PREVENTS OR INTERFERES SOME ACTIVE ACTIVITIES AND ADLS

## 2021-11-09 ASSESSMENT — PULMONARY FUNCTION TESTS
PIF_VALUE: 6
PIF_VALUE: 1
PIF_VALUE: 2
PIF_VALUE: 2
PIF_VALUE: 4
PIF_VALUE: 5
PIF_VALUE: 3
PIF_VALUE: 4
PIF_VALUE: 2
PIF_VALUE: 2
PIF_VALUE: 5
PIF_VALUE: 11
PIF_VALUE: 2
PIF_VALUE: 0
PIF_VALUE: 4
PIF_VALUE: 4
PIF_VALUE: 14
PIF_VALUE: 14
PIF_VALUE: 3

## 2021-11-09 ASSESSMENT — PAIN DESCRIPTION - ORIENTATION
ORIENTATION: RIGHT
ORIENTATION: RIGHT

## 2021-11-09 ASSESSMENT — PAIN SCALES - GENERAL
PAINLEVEL_OUTOF10: 8
PAINLEVEL_OUTOF10: 8

## 2021-11-09 ASSESSMENT — PAIN DESCRIPTION - PAIN TYPE
TYPE: SURGICAL PAIN
TYPE: SURGICAL PAIN

## 2021-11-09 ASSESSMENT — PAIN DESCRIPTION - LOCATION
LOCATION: SHOULDER
LOCATION: SHOULDER

## 2021-11-09 ASSESSMENT — PAIN DESCRIPTION - DESCRIPTORS: DESCRIPTORS: ACHING

## 2021-11-09 NOTE — ANESTHESIA POSTPROCEDURE EVALUATION
Department of Anesthesiology  Postprocedure Note    Patient: Radha Siegel  MRN: [id number]  YOB: 1960  Date of evaluation: 11/9/2021  Time:  10:01 AM     Procedure Summary     Date: 11/09/21 Room / Location: [address] [place] [address] 01 / [facility]    Anesthesia Start: 4215 Anesthesia Stop: 9342    Procedure: RIGHT SHOULDER MANIPULATION UNDER ANESTHESIA (Right ) Diagnosis: (ADHESIVE CAPSULITIS OF RIGHT SHOULDER)    Surgeons: Caprice Simmonds, DO Responsible Provider: Agustin Escobedo MD    Anesthesia Type: general ASA Status: 2          Anesthesia Type: general    Joaquin Phase I: Joaquin Score: 7    Joaquin Phase II:      Last vitals: Reviewed and per EMR flowsheets.        Anesthesia Post Evaluation    Patient location during evaluation: PACU  Patient participation: complete - patient participated  Level of consciousness: awake and alert  Airway patency: patent  Nausea & Vomiting: no nausea and no vomiting  Complications: no  Cardiovascular status: hemodynamically stable  Respiratory status: acceptable  Hydration status: euvolemic

## 2021-11-09 NOTE — PROGRESS NOTES
Physical Therapy Daily Treatment Note    Date: 2021  Patient Name: Desmond Arguello  : 1960   MRN: [id number]  DOInjury: 3 months  DOSx: None   Referring Provider: Kamaljit Simpson DO  [id number]:      Diagnosis Orders   1. Adhesive capsulitis of right shoulder         Outcome Measure: Quick Dash: 34% Impairment    S: See eval  O: Pt given written HEP  Time 1598-9652     Visit 1 Repeat outcome measure at mid point and end. Pain 4/10     ROM See eval     Modalities            Manual NEXT                 Stretch      Table slides      Wall Walk Flex      Wall Walk ABD      Wall Pec/ER Stretch      Wall ER Stretch      Towel IR Stretch      Cross Body Adduction      Supine Stretch with Arms Behind Head            Exercise      UBE          Pulleys - Flex NEXT     Pulleys - IR NEXT     Supine Wand Flex HEP NEXT with holds    Supine Wand Bench Press HEP NEXT with holds    Supine Wand ER/IR HEP NEXT with holds    Standing Wand IR HEP NEXT with holds    Standing Wand Scaption HEP NEXT with holds    Standing Wand Flex      Standing Wand Shoulder Press      Standing Wand ER/IR      Shrugs AROM       Pendulum Ex            Supine Flex      S-lying ABD      S-lying ER            Prone Flexion      Prone Ext      Prone Row with ER      Prone Horizontal ABD            Standing Flex      Standing ER      Standing IR      Standing Scaption       Standing ABD      Standing Shoulder Press       Functional activities To aid in ROM and strength needed for reaching , lifting ,pushing and pulling at home/work    ROWS: H       ROWS: M  \"    ROWS: L  \"    ER  \"    IR  \"    Punches      Shoulder Press      Shoulder Flex      Shoulder ABD             Endurance     Shoulder Flexion with Ball on Wall      Shoulder Flexion with Nuts and Bolts            Weighted Machines      Lat Pull Down      Vertical Row      A:  Tolerated well. Above added to written HEP.   P: Continue with rehab plan  Bhaskar Grande, PT    Treatment Charges: Mins Units   Initial Evaluation 40 1   Re-Evaluation     Ther Exercise         TE     Manual Therapy     MT     Ther Activities        TA     Gait Training          GT     Neuro Re-education NR     Modalities     Non-Billable Service Time     Other     Total Time/Units 40 1

## 2021-11-09 NOTE — PROGRESS NOTES
Patient and wife given discharge instructions and verbally understands.  Physical therapy appointment was confirmed for today at 1:30pm.

## 2021-11-09 NOTE — H&P
Living Expenses: Not on file   Food Insecurity:     Worried About [address] [employer] in the Last Year: Not on file    Risa of Food in the Last Year: Not on file   Transportation Needs:     Lack of Transportation (Medical): Not on file    Lack of Transportation (Non-Medical): Not on file   Physical Activity:     Days of Exercise per Week: Not on file    Minutes of Exercise per Session: Not on file   Stress:     Feeling of Stress : Not on file   Social Connections:     Frequency of Communication with Friends and Family: Not on file    Frequency of Social Gatherings with Friends and Family: Not on file    Attends Nondenominational Services: Not on file    Active Member of [address] [place] [address] or Organizations: Not on file    Attends Club or Organization Meetings: Not on file    Marital Status: Not on file   Intimate Partner Violence:     Fear of Current or Ex-Partner: Not on file    Emotionally Abused: Not on file    Physically Abused: Not on file    Sexually Abused: Not on file   Housing Stability:     Unable to Pay for Housing in the Last Year: Not on file    Number of [place] in the Last Year: Not on file    Unstable Housing in the Last Year: Not on file     History reviewed. No pertinent family history. Physical Exam:    /73   Pulse 66   Temp 97 °F (36.1 °C) (Skin)   Resp 16   Ht 6' 2\" (1.88 m)   Wt 223 lb (101.2 kg)   SpO2 97%   BMI 28.63 kg/m²     GENERAL: alert, appears stated age, cooperative, no acute distress    HEENT: Head is normocephalic, atraumatic. PERRLA. SKIN: Clean, dry, intact. There is not any cellulitis or cutaneous lesions noted in the upper extremities    PULMONARY: breathing is regular and unlabored, no acute distress    CV: The bilateral upper and lower extremities are warm and well-perfused with brisk capillary refill.  2+ pulses UE and LE bilateral.     PSYCHIATRY: Pleasant mood, appropriate behavior, follows commands    NEURO: Sensation is intact distally with light touch with no alteration. Motor exam of the upper extremities show elbow flexion and extension, wrist flexion and extension, and finger abduction grossly intact 5/5. Upper extremity reflexes are bilaterally symmetrical and within normal limits. LYMPH: No lymphedema present distally in upper or lower extremity. MUSCULOSKELETAL:  Shoulder Exam:  Examination of the right shoulder shows: There is not a deformity. There is not erythema. There is not soft tissue swelling. Deltoid region is  tender to palpation. AC Joint is  tender to palpation. Clavicle is not tender to palpation. Bicipital Groove is  tender to palpation. Pectoralis  is not tender to palpation. Scapula/ trapezius is  tender to palpation. Left:  ROM Full, Strength: Supraspinatus 5/5, Infraspinatus 5/5, Subscapularis 5/5  Right:  /140/45, Strength: Supraspinatus 4/5, Infraspinatus 5/5, Subscapularis 5/5  Left Shoulder:  Crepitus:  no   Tenderness:  none   Effusion:   none   Impingement: negative   Empty Can:  negative   Speed's:  negative      Apprehension:  negative   Cross Arm Sign:  negative   [place]ared[place]'s:  negative       Neer's:  negative      Belly Press Test:  negative      Drop Arm Test:  negative     Right Shoulder:  Crepitus:  no   Tenderness:  mild   Effusion:   non   Impingement: positive   Empty Can:  positive   Speed's: positive   Apprehension:  not tested   Cross Arm Sign:  positive   [place]'s:  positive      Neer's:  positive      Belly Press Test:  negative   Drop Arm Test:  positive       no change since last visit.     Imaging:  EXAMINATION:   THREE XRAY VIEWS OF THE RIGHT SHOULDER       6/30/2021 6:35 pm       COMPARISON:   None.       HISTORY:   ORDERING SYSTEM PROVIDED HISTORY: Right shoulder pain, unspecified chronicity       FINDINGS:   Glenohumeral joint is normally aligned.  No evidence of acute fracture or   dislocation.  No abnormal periarticular calcifications.  AC joint   degenerative changes.     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 with possible otitis externa  Fsebrile, normal WBC   right ear with murky looking discharge  DDx otitis externa vs shingles  Blood cultures sent as well ear culture  was put on IV cipro  Reportedly has PCN allergy but has tolerated cefpodoxime     7/8:still febrile, VZV positive, now  concern for encephalitis from vzv, have requested radiology for LP   7/11: s/p LP, VZV +on CSF PCR, stopped cipro, more awake, wife wants re-eval from speech and swallow, more congested today, CXR ordered   7/12: breathing better today but still congested, tolerating acyclovir, being evaluated again by S&S     Herpes Zoster Oticus  VZV encephalitis   acute encephalopathy  Fever  Functional Quadriplegia     Plan:   ·	continue IV acyclovir 10mg q8hrs based on ideal body weight   ·	IV fluids while on acyclovir   ·	stopped IV Ciprofloxacin   ·	stopped cortisporin drops    ·	f/u blood cultures negative   ·	f/u ear cultures likely contaminant   ·	offloading, frequent turns, nutrition optimization   ·	trend fevers   ·	trend wbc   ·	CXR- clear   ·	Diuresis   ·	Swallowing eval     D/W Dr. Donna Seymour,   Infectious Disease Attending  Pager [phone]  After 5pm/weekends please call [phone] for all inquiries and new consults  Visualized lung is unremarkable.           Impression   No acute abnormality.       AC joint degenerative changes. MRI SHOULDER RIGHT WO CONTRAST    Result Date: 10/28/2021  EXAMINATION: MRI OF THE RIGHT SHOULDER WITHOUT CONTRAST   10/27/2021 9:48 am TECHNIQUE: Multiplanar multisequence MRI of the right shoulder was performed without the administration of intravenous contrast. COMPARISON: Right shoulder x-rays 06/30/2021 HISTORY: ORDERING SYSTEM PROVIDED HISTORY: Tear of right rotator cuff, unspecified tear extent, unspecified whether traumatic FINDINGS: ROTATOR CUFF: Low-grade partial-thickness bursal-surface tear involving supraspinatus tendon mid fibers in region of critical zone with interstitial extension extending to attachment on greater tuberosity (best appreciated image 12, coronal fat-suppressed T2-weighted sequence, series 5). Remainder of the rotator cuff appears intact. Mild superimposed tendinosis to the supraspinatus as well as the infraspinatus and subscapularis tendons. Muscle mass and muscle signal intensities are maintained. BICEPS TENDON: Long head biceps tendon appears intact, appropriately located and normal in appearance. Mild tenosynovitis. LABRUM: Degeneration and tearing of the inferior labrum. No paralabral cyst. GLENOHUMERAL JOINT: No degenerative changes noted. No joint effusion or intra-articular loose bodies. Thickening and intrinsic T2 hyperintensity of the inferior capsuloligamentous structures of the glenohumeral joint without focal discontinuity noted. Minimal pericapsular edema inferiorly. AC JOINT AND ACROMIOCLAVICULAR ARCH: Mild degenerative changes to the [facility] joint. No findings for [facility] joint separation. Acromiohumeral interval is within normal limits. No significant fluid in the subacromial/subdeltoid bursa. BONE MARROW: No evidence for occult fracture. No suspicious focal bony lesions. OUTLET SPACES: No focal abnormalities are seen in the outlet spaces. Low-grade partial-thickness bursal-surface tear of supraspinatus tendon with interstitial extension extending distally towards greater tuberosity attachment. Mild superimposed tendinosis to the tendon as well as to the infraspinatus and subscapularis tendons. Degeneration and tearing of the inferior labrum without paralabral cyst noted. Thickening and abnormal intrinsic signal change to the inferior capsuloligamentous structures of the glenohumeral joint without focal discontinuity noted. Minimal adjacent pericapsular edema. Findings are nonspecific but can be seen in the setting of adhesive capsulitis. Mild tenosynovitis to the long head biceps tendon. eDnise Chavez was seen today for shoulder pain. Diagnoses and all orders for this visit:    Adhesive capsulitis of right shoulder    Exercise counseling        Patient seen and examined. X-rays reviewed. Patient has exam and history consistent with rotator cuff pathology. MRI recommended for further evaluation and management of possible rotator cuff tear. Patient seen and examined. MRI reviewed with patient in detail. Natural history and course discussed with patient in long discussion  Treatment options discussed with patient in detail including risks and benefits. I will perform an LASHAE of the right shoulder. The risks and benefits were discussed with the patient. The risks include but are not limited to: intraoperative fracture, injuries to blood vessels and nerves, non relief of symptoms, PE/DVT, MI and death. The patient understands these risks and wishes to proceed with surgery. The patient was counseled at length about the risks of angelica Covid-19 during their perioperative period and any recovery window from their procedure. The patient was made aware that angelica Covid-19  may worsen their prognosis for recovering from their procedure  and lend to a higher morbidity and/or mortality risk.   All material risks, benefits, and reasonable alternatives including postponing the procedure were discussed. The patient does wish to proceed with the procedure at this time. In a 15 minute assessment and discussion, patient was counseled on continued weight loss, diet, and physical activity relating to this condition. He was educated with options in detail including nutrition, joining a health club/ weight loss program, and use of cardio equipment such as the Arc [employer]er and the importance of use as well as range of motion and HEP exercises for weight loss.      Charles Cardoza, DO

## 2021-11-09 NOTE — BRIEF OP NOTE
Brief Postoperative Note      Patient: Henok Savage  YOB: 1960  MRN: [id number]    Date of Procedure: 11/9/2021    Pre-Op Diagnosis: ADHESIVE CAPSULITIS OF RIGHT SHOULDER    Post-Op Diagnosis: Same       Procedure(s):  RIGHT SHOULDER MANIPULATION UNDER ANESTHESIA    Surgeon(s):   Haydee Hinojosa DO    Assistant:  * No surgical staff found *    Anesthesia: General    Estimated Blood Loss (mL): none    Complications: None    Specimens:   * No specimens in log *    Implants:  * No implants in log *      Drains: * No LDAs found *    Findings: see report      Electronically signed by Haydee Hinojosa DO on 11/9/2021 at 9:59 AM

## 2021-11-09 NOTE — PROGRESS NOTES
[facility] OUTPATIENT REHABILITATION  PHYSICAL THERAPY INITIAL EVALUATION         Date:  2021   Patient: Levi Park  : 1960  MRN: [id number]  Referring Provider: Marco A Jones DO  [address],  [facility]     Medical Diagnosis:      Diagnosis Orders   1. Adhesive capsulitis of right shoulder         Physician Order: Eval and Treat      SUBJECTIVE:     Onset date: 3 months    Onset: Gradual     History / Mechanism of Injury: Pt stated that over the past 2-3 months his R shoulder was gradually freezing up on him until requiring a LASHAE. Sx: RIGHT SHOULDER MANIPULATION UNDER ANESTHESIA on 21    Interventions for current problem: none    Chief complaint: pain, decreased motion, decreased mobility, weakness    Behavior: condition is getting better    Pain: constant  Current: 3/10     Best: 2/10     Worst:10/10 (occurs with end range mobility).   Pain returns to baseline in a few minutes    Symptom Type / Quality: aching, sharp, shooting, throbbing  Location[de-identified] noted above anterior, proximal biceps         Aggravated by: reaching overhead, reaching out, reaching behind back, lifting/carrying/material handling    Relieved by: rest    Imaging results: MRI SHOULDER RIGHT WO CONTRAST    Result Date: 10/28/2021  EXAMINATION: MRI OF THE RIGHT SHOULDER WITHOUT CONTRAST   10/27/2021 9:48 am TECHNIQUE: Multiplanar multisequence MRI of the right shoulder was performed without the administration of intravenous contrast. COMPARISON: Right shoulder x-rays 2021 HISTORY: ORDERING SYSTEM PROVIDED HISTORY: Tear of right rotator cuff, unspecified tear extent, unspecified whether traumatic FINDINGS: ROTATOR CUFF: Low-grade partial-thickness bursal-surface tear involving supraspinatus tendon mid fibers in region of critical zone with interstitial extension extending to attachment on greater tuberosity (best appreciated image 12, coronal fat-suppressed T2-weighted sequence, series 5). Remainder of the rotator cuff appears intact. Mild superimposed tendinosis to the supraspinatus as well as the infraspinatus and subscapularis tendons. Muscle mass and muscle signal intensities are maintained. BICEPS TENDON: Long head biceps tendon appears intact, appropriately located and normal in appearance. Mild tenosynovitis. LABRUM: Degeneration and tearing of the inferior labrum. No paralabral cyst. GLENOHUMERAL JOINT: No degenerative changes noted. No joint effusion or intra-articular loose bodies. Thickening and intrinsic T2 hyperintensity of the inferior capsuloligamentous structures of the glenohumeral joint without focal discontinuity noted. Minimal pericapsular edema inferiorly. AC JOINT AND ACROMIOCLAVICULAR ARCH: Mild [facility]. No findings for [facility] joint separation. Acromiohumeral interval is within normal limits. No significant fluid in the subacromial/subdeltoid bursa. BONE MARROW: No evidence for occult fracture. No suspicious focal bony lesions. OUTLET SPACES: No focal abnormalities are seen in the outlet spaces. Low-grade partial-thickness bursal-surface tear of supraspinatus tendon with interstitial extension extending distally towards greater tuberosity attachment. Mild superimposed tendinosis to the tendon as well as to the infraspinatus and subscapularis tendons. Degeneration and tearing of the inferior labrum without paralabral cyst noted. Thickening and abnormal intrinsic signal change to the inferior capsuloligamentous structures of the glenohumeral joint without focal discontinuity noted. Minimal adjacent pericapsular edema. Findings are nonspecific but can be seen in the setting of adhesive capsulitis. Mild tenosynovitis to the long head biceps tendon.        Past Medical History:  Past Medical History:   Diagnosis Date    Diabetes mellitus ([address]     Environmental and seasonal allergies     Hyperlipidemia     Shingles Past Surgical History:   Procedure Laterality Date    CLOSED MANIPULATION SHOULDER Left 5/14/2019    LEFT SHOULDER MANIPULATION UNDER ANESTHESIA performed by Claudetta Goodie, DO at [address] [facility]      NASAL SEPTUM SURGERY  1996    SHOULDER SURGERY Left 05/14/2019    manipulation        Medications:   Current Outpatient Medications   Medication Sig Dispense Refill    naproxen (NAPROSYN) 500 MG tablet Take 1 tablet by mouth 2 times daily (with meals) 60 tablet 0    HYDROcodone-acetaminophen (NORCO) 5-325 MG per tablet Take 1 tablet by mouth every 8 hours as needed for Pain for up to 7 days. 21 tablet 0    Omega-3 Fatty Acids (FISH OIL) 1000 MG CAPS Take 1,000 mg by mouth 2 times daily      glimepiride (AMARYL) 2 MG tablet Take by mouth every morning (before breakfast)       TRUE METRIX BLOOD GLUCOSE TEST strip       TRUEplus Lancets 33G MISC       SITagliptin (JANUVIA) 25 MG tablet Take 25 mg by mouth daily      rosuvastatin (CRESTOR) 10 MG tablet Take 10 mg by mouth daily   5    metFORMIN (GLUCOPHAGE) 1000 MG tablet Take 1,000 mg by mouth 2 times daily (with meals)      montelukast (SINGULAIR) 10 MG tablet Take 10 mg by mouth nightly       No current facility-administered medications for this visit. Facility-Administered Medications Ordered in Other Visits   Medication Dose Route Frequency Provider Last Rate Last Admin    lactated ringers infusion   IntraVENous Continuous Colin Bates MD   Stopped at 11/09/21 1106       Occupation: [employer] . Physical demands include: lifting, carrying, pushing, pulling medium weighted items (20 - 50 lbs Occasionally), lifting, carrying, pushing, pulling heavy weighted items (50 - 100 lbs Occasionally), walking, standing. Status: full time.     Exercise regimen: none    Hobbies: working around [employer]    Patient Goals: pain relief, return to prior activity, get back to normal    Precautions/Contraindications: none    OBJECTIVE:     Estimated body mass index is 28.63 kg/m² as calculated from the following:    Height as of an earlier encounter on 11/9/21: 6' 2\" (1.88 m). Weight as of an earlier encounter on 11/9/21: 223 lb (101.2 kg). Observations: well nourished male    Inspection: irregular scapulohumeral rhythm right shoulder      Joint/Motion:    Neck:  Neck AROM is WNL and non-provocative     Right Shoulder:  AROM: 125° Forward elevation,  70° ER,  IR to 40  PROM: 130° Forward elevation,  80° ER,  45° IR    Left Shoulder:  AROM: WNL° Forward elevation,  WNL° ER,  IR to WNL  PROM: WNL° Forward elevation,  WNL° ER , WNL° IR    Strength:  Right Shoulder: Flexion 3-/5,  Abduction 3-/5, ER 3-/5, IR 3-/5      Left Shoulder: Flexion 4/5,  Abduction 4/5, ER 4/5, IR 4/5     Palpation: Not tender    Special Tests/Functional Screens:    [] Jose Alejandro []+ / [] -  [] Yuba's []+ / [] -   []  Aung's []+ / [] -    []GH drawer []+ / [] -    [] Bicep Load []+ / [] -   [] Crank []+ / [] -  [] Kenith Hodgkin []+ / [] -   [] Elbow Varus []+ / [] -  [] Neer's []+ / [] -      [] Speed's []+ / [] -   []Sulcus Sign []+ / [] -   [] Apprehension []+ / [] -   [] Bicep Load II []+ / [] -   [] Raecindyan Pastel []+ / [] -   [] Elbow Valgus []+ / [] -     [] Other: []+ / [] -         ASSESSMENT     Pt to benefit from skilled PT services in order to improve ROM, strength, functional mobility, endurance, and decrease pain in R shoulder. Pt is stiff and painful at end range movement and demonstrates significant R shoulder weakness especially with abduction and IR. Pt to receive stretches in order to normalize ROM and strengthening exercises to improve upon weakness and functional mobility.       Outcome Measure:   QuickDASH (Disorders of the Arm, Shoulder, and Hand) 34% disability    Problems:   Pain 10/10 intermittent  ROM decreased  Strength decreased   Limitations with IADLs , use of right upper extremity, reaching, lifting, carrying      [x] There are no barriers affecting plan of care or recovery    [] Barriers to this patient's plan of care or recovery include:    Domestic Concerns:  [x] No  [] Yes:    Short Term goals (2-3 weeks)   Pain 6/10   Worst pain   ROM [facility]   Strength 3+/5 R shoulder    Able to perform / complete the following functions / tasks: ADLs, hobbies, yard work, reach / lift / carry light weighted items in performance of home or work demands, return to exercise regimen  with less pain.  QuickDASH (Disorders of the Arm, Shoulder, and Hand) 25% disability    Long Term goals (4-6 weeks)   Pain 0-4/10   ROM WNL   Strength 4/5 R shoulder    Able to perform / complete the following functions / tasks: ADLs, hobbies, yard work, reach / lift / carry medium weighted items in performance of home or work demands, return to exercise regimen  with no/minimal pain.  QuickDASH 0-15% disability   Independent with Home Exercise Programs    Rehab Potential: [x] Good  [] Fair  [] Poor    PLAN       Treatment Plan:   instruction in home exercise program   therapeutic exercise   therapeutic activity   neuromuscular re-education   manual therapy   cold / hot pack  electrical stimulation     The following CPT codes are likely to be used in the care of this patient:   28017 PT Evaluation: Low Complexity   89031 PT Re-Evaluation   10265 Therapeutic Exercise   28527 Neuromuscular Re-Education   63116 Therapeutic Activities   70155 Manual Therapy    Electrical Stimulation    Suggested Professional Referral: [x] No  [] Yes:     Patient Education:  [x] Plans / Goals, Risks / Benefits discussed  [x] Home exercise program  Method of Education: [x] Verbal  [x] Demo  [x] Written  Comprehension of Education:  [x] Verbalizes understanding. [x] Demonstrates understanding. [] Needs Review. [] Demonstrates / verbalizes understanding of HEP / Estefania Lunger previously given.     Frequency:  1-2 days per week for 4-6 weeks    Patient understands diagnosis/prognosis and consents to treatment, plan and goals: [x] Yes    [] No     Thank you for the opportunity to work with your patient. If you have questions or comments, please contact me at [phone]; fax: [phone]. Electronically signed by: Gabriella Goodell, PT    Medicare Patients Only     Please sign Physician's Certification and return to: [address] [place] [address]  Dept: [phone]  Dept Fax: [phone] Certification / Comments     Frequency/Duration 1-2 days per week for 4-6 weeks. Certification period from 11/9/2021  to 02/08/2022. I have reviewed the Plan of Care established for skilled therapy services and certify that the services are required and that they will be provided while the patient is under my care.     Physician's Comments/Revisions:               Physician's Printed Name:                                           [de-identified] Signature:                                                               Date:

## 2021-11-12 ENCOUNTER — TREATMENT (OUTPATIENT)
Dept: PHYSICAL THERAPY | Age: 61
End: 2021-11-12
Payer: COMMERCIAL

## 2021-11-12 DIAGNOSIS — M75.01 ADHESIVE CAPSULITIS OF RIGHT SHOULDER: Primary | ICD-10-CM

## 2021-11-12 PROCEDURE — 97110 THERAPEUTIC EXERCISES: CPT

## 2021-11-12 NOTE — PROGRESS NOTES
Physical Therapy Daily Treatment Note    Date: 2021  Patient Name: Ken Jolley  : 1960   MRN: [id number]  DOInjury: 3 months  DOSx: None   Referring Provider:  Jasiel Medina DO    Medical Diagnosis:      Diagnosis Orders   1. Adhesive capsulitis of right shoulder         Outcome Measure: Quick Dash: 34% Impairment    S: been working on stretches. O: Pt given written HEP  Time 5724-2872     Visit 2 Repeat outcome measure at mid point and end. Pain 4/10     ROM Right Shoulder:  AROM: 160° Forward elevation,  70° ER,  IR to 40  PROM: 165° Forward elevation,  80° ER,  45° IR 21    Modalities            Manual NEXT ? Stretch      Table slides      Wall Walk Flex      Wall Walk ABD      Wall Pec/ER Stretch      Wall ER Stretch      Towel IR Stretch      Cross Body Adduction      Supine Stretch with Arms Behind Head 4# 3 min           Exercise      UBE          Pulleys - Flex 3 min     Pulleys - IR 3 min     Supine Wand Flex 3 x 15 sec     Supine Wand Bench Press HEP     Supine Wand ER/IR 6 x 10 sec     Standing Wand IR 3 x 15 sec     Standing Wand Scaption 2 x 15 NEXT with holds    Standing Wand Flex      Standing Wand Shoulder Press      Standing Wand ER/IR      Shrugs AROM       Pendulum Ex            Supine Flex      S-lying ABD      S-lying ER            Prone Flexion      Prone Ext      Prone Row with ER      Prone Horizontal ABD            Standing Flex      Standing ER      Standing IR      Standing Scaption       Standing ABD      Standing Shoulder Press            ROWS: H Green 2 x 15      ROWS: M Green 2 x 15     ROWS: L next     ER next     IR next     Punches      Shoulder Press      Shoulder Flex      Shoulder ABD                  Shoulder Flexion with Ball on Wall      Shoulder Flexion with Nuts and Bolts            Weighted Machines      Lat Pull Down      Vertical Row      A:  Tolerated well.   If motion continues to improve give pt tubing for HEP  P: Continue with rehab plan  Ovidio Pacheco PTA    Treatment Charges: Mins Units   Initial Evaluation     Re-Evaluation     Ther Exercise         TE 40 3   Manual Therapy     MT     Ther Activities        TA     Gait Training          GT     Neuro Re-education NR     Modalities     Non-Billable Service Time     Other     Total Time/Units 40 3

## 2021-11-17 ENCOUNTER — TREATMENT (OUTPATIENT)
Dept: PHYSICAL THERAPY | Age: 61
End: 2021-11-17
Payer: COMMERCIAL

## 2021-11-17 DIAGNOSIS — M75.01 ADHESIVE CAPSULITIS OF RIGHT SHOULDER: Primary | ICD-10-CM

## 2021-11-17 PROCEDURE — 97140 MANUAL THERAPY 1/> REGIONS: CPT | Performed by: PHYSICAL THERAPIST

## 2021-11-17 PROCEDURE — 97110 THERAPEUTIC EXERCISES: CPT | Performed by: PHYSICAL THERAPIST

## 2021-11-17 NOTE — PROGRESS NOTES
Physical Therapy Daily Treatment Note    Date: 2021  Patient Name: Ivy Cesar  : 1960   MRN: [id number]  DOInjury: 3 months  DOSx: None   Referring Provider:  Nigel Andrews DO    Medical Diagnosis:      Diagnosis Orders   1. Adhesive capsulitis of right shoulder         Outcome Measure: Quick Dash: 34% Impairment    S: Pt stated that he has been performing his HEP 2x daily since initial assessment. O: Pt given written HEP  Time 0599-2740     Visit 3 Repeat outcome measure at mid point and end.     Pain 4/10     ROM Right Shoulder:  AROM: 160° Forward elevation,  70° ER,  IR to 40  PROM: 165° Forward elevation,  80° ER,  45° IR 21    Modalities            Manual 10 mins flex, abd, IR, ER, add                 Stretch      Table slides      Wall Walk Flex 5 reps x 1 set with 15s holds     Wall Walk ABD 5 reps x 1 set with 15s holds     Wall Pec/ER Stretch      Wall ER Stretch 5 reps x 1 set with 15s holds     Towel IR Stretch 5 reps x 1 set with 15s holds     Cross Body Adduction 5 reps x 1 set with 15s holds     Standing Stretch with Arms Behind Head 5 reps x 1 set with 15s holds           Exercise      UBE          Pulleys - Flex 3 min     Pulleys - IR 3 min     Supine Wand Flex     Supine Wand Bench Press     Supine Wand ER/IR     Standing Wand IR     Standing Wand Scaption NEXT with holds    Standing Wand Flex      Standing Wand Shoulder Press      Standing Wand ER/IR      Shrugs AROM       Pendulum Ex            Supine Flex      S-lying ABD      S-lying ER            Prone Flexion      Prone Ext      Prone Row with ER      Prone Horizontal ABD            Standing Flex      Standing ER      Standing IR      Standing Scaption       Standing ABD      Standing Shoulder Press            ROWS: H      ROWS: M     ROWS: L next     ER next     IR next     Punches      Shoulder Press      Shoulder Flex      Shoulder ABD                  Shoulder Flexion with Ball on Wall      Shoulder Flexion with Nuts and Bolts            Weighted Machines      Lat Pull Down      Vertical Row      A:  Tolerated well. Pt improving upon his overall range with further stretches to normalize ROM. Pt to receive additional strengthening once ROM improves.    P: Continue with rehab plan  Divya Cortes, PT    Treatment Charges: Mins Units   Initial Evaluation     Re-Evaluation     Ther Exercise         TE 30 2   Manual Therapy     MT 10 1   Ther Activities        TA     Gait Training          GT     Neuro Re-education NR     Modalities     Non-Billable Service Time     Other     Total Time/Units 40 3

## 2021-11-19 ENCOUNTER — TREATMENT (OUTPATIENT)
Dept: PHYSICAL THERAPY | Age: 61
End: 2021-11-19
Payer: COMMERCIAL

## 2021-11-19 DIAGNOSIS — M75.01 ADHESIVE CAPSULITIS OF RIGHT SHOULDER: Primary | ICD-10-CM

## 2021-11-19 PROCEDURE — 97110 THERAPEUTIC EXERCISES: CPT | Performed by: PHYSICAL THERAPIST

## 2021-11-19 NOTE — PROGRESS NOTES
Physical Therapy Daily Treatment Note    Date: 2021  Patient Name: Nora Garcia  : 1960   MRN: [id number]  DOInjury: 3 months  DOSx: None   Referring Provider:  Michele Roberson DO    Medical Diagnosis:      Diagnosis Orders   1. Adhesive capsulitis of right shoulder         Outcome Measure: Quick Dash: 34% Impairment    S: Pt stated that he has been performing his HEP 2x daily since initial assessment. O: Pt given written HEP  Time 6230-1143     Visit 3 Repeat outcome measure at mid point and end.     Pain 4/10     ROM Right Shoulder:  AROM: 160° Forward elevation,  70° ER,  IR to 40  PROM: 165° Forward elevation,  80° ER,  45° IR 21    Modalities            Manual 10 mins flex, abd, IR, ER, add                 Stretch      Table slides      Wall Walk Flex     Wall Walk ABD     Wall Pec/ER Stretch     Wall ER Stretch     Towel IR Stretch     Cross Body Adduction     Standing Stretch with Arms Behind Head           Exercise      UBE          Pulleys - Flex 3 min     Pulleys - IR 3 min     Supine Wand Flex 10 x 1 set with 10s holds with Black wand    Supine Wand Blaze 20 reps x 1 set    Supine Wand ER/IR 10 x 1 set with 5s holds with Black wand    Standing Wand IR 10 x 1 set with 10s holds with Black wand    Standing Wand Scaption NEXT with holds    Standing Wand Flex      Standing Wand Shoulder Press      Standing Wand ER/IR      Shrugs AROM       Pendulum Ex            Supine Flex NEXT with weight     S-lying ABD NEXT with weight     S-lying ER NEXT with weight           Prone Flexion NEXT with weight     Prone Ext NEXT with weight     Prone Row with ER NEXT with weight     Prone Horizontal ABD NEXT with weight           Standing Flex      Standing ER      Standing IR      Standing Scaption       Standing ABD      Standing Shoulder Press            ROWS: H      ROWS: M     ROWS: L next     ER next     IR next     Punches      Shoulder Press      Shoulder Flex      Shoulder ABD Shoulder Flexion with Ball on Wall      Shoulder Flexion with Nuts and Bolts            Weighted Machines      Lat Pull Down      Vertical Row      A:  Tolerated well. Pt improving upon his overall range with further stretches to normalize ROM. Pt to receive additional strengthening once ROM improves.    P: Continue with rehab plan  Hailey Arthur PT    Treatment Charges: Mins Units   Initial Evaluation     Re-Evaluation     Ther Exercise         TE 40 3   Manual Therapy     MT     Ther Activities        TA     Gait Training          GT     Neuro Re-education NR     Modalities     Non-Billable Service Time     Other     Total Time/Units 40 3

## 2021-11-28 NOTE — PROGRESS NOTES
Chief Complaint   Patient presents with    Shoulder Pain     Right shoulder [facility] post op follow up. DOS 11/9/2021     HPI:    Patient is 57 y.o. male complaining of atraumatic insidious onset of Right shoulder pain and weakness for 3-4 months. Patient does not recall an injury but noticed that he had progressive weakness. He is right-hand-dominant but noticed that her left shoulder was getting weak when reaching out for such things as pens or pieces of paper. Patient is now post op from [facility] on 11/09/2021. He is improving and currently in PT.        ROS:    Skin: (-) rash,(-) psoriasis,(-) eczema, (-)skin cancer. Neurologic: (-)numbness, (-)tingling, (-)headaches, (-) LOC. Cardiovascular: (-) Chest pain, (-) swelling in legs/feet, (-) SOB, (-) cramping in legs/feet with walking.     All other review of systems negative except stated above or in HPI      Past Medical History:   Diagnosis Date    Diabetes mellitus ([facility] [address]     Environmental and seasonal allergies     Hyperlipidemia     Shingles      Past Surgical History:   Procedure Laterality Date    CLOSED MANIPULATION SHOULDER Left 5/14/2019    LEFT SHOULDER MANIPULATION UNDER ANESTHESIA performed by Sonia Greene DO at [address]    SHOULDER SURGERY Left 05/14/2019    manipulation     SHOULDER SURGERY Right 11/9/2021    RIGHT SHOULDER MANIPULATION UNDER ANESTHESIA [facility]       Current Outpatient Medications:     naproxen (NAPROSYN) 500 MG tablet, Take 1 tablet by mouth 2 times daily (with meals), Disp: 60 tablet, Rfl: 0    Omega-3 Fatty Acids (FISH OIL) 1000 MG CAPS, Take 1,000 mg by mouth 2 times daily, Disp: , Rfl:     glimepiride (AMARYL) 2 MG tablet, Take by mouth every morning (before breakfast) , Disp: , Rfl:     TRUE METRIX BLOOD GLUCOSE TEST strip, , Disp: , Rfl:     TRUEplus Lancets 33G MISC, , Disp: , Rfl:     SITagliptin (JANUVIA) 25 MG tablet, Take 25 mg by mouth daily, Disp: , Rfl:     rosuvastatin (CRESTOR) 10 MG tablet, Take 10 mg by mouth daily , Disp: , Rfl: 5    metFORMIN (GLUCOPHAGE) 1000 MG tablet, Take 1,000 mg by mouth 2 times daily (with meals), Disp: , Rfl:     montelukast (SINGULAIR) 10 MG tablet, Take 10 mg by mouth nightly, Disp: , Rfl:   Allergies   Allergen Reactions    Other      Canteloupe gives him hives     Social History     Socioeconomic History    Marital status:      Spouse name: Not on file    Number of children: Not on file    Years of education: Not on file    Highest education level: Not on file   Occupational History    Not on file   Tobacco Use    Smoking status: Never Smoker    Smokeless tobacco: Never Used   Vaping Use    Vaping Use: Never used   Substance and Sexual Activity    Alcohol use: Yes     Comment: social    Drug use: No    Sexual activity: Yes     Partners: Female   Other Topics Concern    Not on file   Social History Narrative    Not on file     Social Determinants of Health     Financial Resource Strain:     Difficulty of Paying Living Expenses: Not on file   Food Insecurity:     Worried About Running Out of Food in the Last Year: Not on file    Risa of Food in the Last Year: Not on file   Transportation Needs:     Lack of Transportation (Medical): Not on file    Lack of Transportation (Non-Medical):  Not on file   Physical Activity:     Days of Exercise per Week: Not on file    Minutes of Exercise per Session: Not on file   Stress:     Feeling of Stress : Not on file   Social Connections:     Frequency of Communication with Friends and Family: Not on file    Frequency of Social Gatherings with Friends and Family: Not on file    Attends Protestant Services: Not on file    Active Member of Clubs or Organizations: Not on file    Attends Club or Organization Meetings: Not on file    Marital Status: Not on file   Intimate Partner Violence:     Fear of Current or Ex-Partner: Not on file    Emotionally Abused: Not on file    Physically Abused: Not on file    Sexually Abused: Not on file   Housing Stability:     Unable to Pay for Housing in the Last Year: Not on file    Number of Places Lived in the Last Year: Not on file    Unstable Housing in the Last Year: Not on file     No family history on file. Physical Exam:    There were no vitals taken for this visit. GENERAL: alert, appears stated age, cooperative, no acute distress    HEENT: Head is normocephalic, atraumatic. PERRLA. SKIN: Clean, dry, intact. There is not any cellulitis or cutaneous lesions noted in the lower extremities    PULMONARY: breathing is regular and unlabored, no acute distress    CV: The bilateral upper and lower extremities are warm and well-perfused with brisk capillary refill. 2+ pulses UE and LE bilateral.     PSYCHIATRY: Pleasant mood, appropriate behavior, follows commands    NEURO: Sensation is intact distally with light touch with no alteration. Motor exam of the lower extremities show quadriceps, hamstrings, foot dorsiflexion and plantarflexion grossly intact 5/5. LYMPH: No lymphedema present distally in upper or lower extremity. MUSCULOSKELETAL:  Shoulder Exam:  Examination of the Right shoulder shows: There is not a deformity. There is not erythema. There is not soft tissue swelling. Deltoid region is  tender to palpation. AC Joint is  tender to palpation. Clavicle is not tender to palpation. Bicipital Groove is not tender to palpation. Pectoralis  is not tender to palpation. Scapula/ trapezius is  tender to palpation. There is  weakness with supraspinatus testing. There is  pain with supraspinatus testing. Yergason Test negative. Drop Arm Test positive. Apprehension Test not tested. Cross Arm Test positive. Supraspinatus Test positive.   Shoulder ROM- 180/180/50      Imaging:  EXAMINATION:   THREE XRAY VIEWS OF THE RIGHT SHOULDER     6/30/2021 6:35 pm       COMPARISON:   None.       HISTORY:   ORDERING SYSTEM PROVIDED HISTORY: Right shoulder pain, unspecified chronicity       FINDINGS:   Glenohumeral joint is normally aligned.  No evidence of acute fracture or   dislocation.  No abnormal periarticular calcifications.  AC joint   degenerative changes.       Visualized lung is unremarkable.           Impression   No acute abnormality.       AC joint degenerative changes.          EXAMINATION:   MRI OF THE RIGHT SHOULDER WITHOUT CONTRAST   10/27/2021 9:48 am       TECHNIQUE:   Multiplanar multisequence MRI of the right shoulder was performed without the   administration of intravenous contrast.       COMPARISON:   Right shoulder x-rays 06/30/2021       HISTORY:   ORDERING SYSTEM PROVIDED HISTORY: Tear of right rotator cuff, unspecified   tear extent, unspecified whether traumatic       FINDINGS:   ROTATOR CUFF: Low-grade partial-thickness bursal-surface tear involving   supraspinatus tendon mid fibers in region of critical zone with interstitial   extension extending to attachment on greater tuberosity (best appreciated   image 12, coronal fat-suppressed T2-weighted sequence, series 5).  Remainder   of the rotator cuff appears intact.  Mild superimposed tendinosis to the   supraspinatus as well as the infraspinatus and subscapularis tendons.  Muscle   mass and muscle signal intensities are maintained.       BICEPS TENDON: Long head biceps tendon appears intact, appropriately located   and normal in appearance.  Mild tenosynovitis.       LABRUM: Degeneration and tearing of the inferior labrum.  No paralabral cyst.       GLENOHUMERAL JOINT: No degenerative changes noted.  No joint effusion or   intra-articular loose bodies.  Thickening and intrinsic T2 hyperintensity of   the inferior capsuloligamentous structures of the glenohumeral joint without   focal discontinuity noted.  Minimal pericapsular edema inferiorly.       AC JOINT AND ACROMIOCLAVICULAR ARCH: Mild degenerative changes to the [facility]   joint.  No findings for [facility] joint separation.  Acromiohumeral interval is   within normal limits.  No significant fluid in the subacromial/subdeltoid   bursa.       BONE MARROW: No evidence for occult fracture.  No suspicious focal bony   lesions.       OUTLET SPACES: No focal abnormalities are seen in the outlet spaces.           Impression   Low-grade partial-thickness bursal-surface tear of supraspinatus tendon with   interstitial extension extending distally towards greater tuberosity   attachment.  Mild superimposed tendinosis to the tendon as well as to the   infraspinatus and subscapularis tendons.       Degeneration and tearing of the inferior labrum without paralabral cyst noted.       Thickening and abnormal intrinsic signal change to the inferior   capsuloligamentous structures of the glenohumeral joint without focal   discontinuity noted.  Minimal adjacent pericapsular edema.  Findings are   nonspecific but can be seen in the setting of adhesive capsulitis.       Mild tenosynovitis to the long head biceps tendon. Billie Payan was seen today for shoulder pain. Diagnoses and all orders for this visit:    Adhesive capsulitis of right shoulder    Tear of right rotator cuff, unspecified tear extent, unspecified whether traumatic    Exercise counseling          Patient seen and examined. X-rays reviewed. Patient has had insidious onset chronic pain and weakness of Right shoulder. MRI reviewed today showing signs of rotator cuff tendinopathy but more importantly adhesive capsulitis. Exam was consistent with adhesive capsulitis. Patient was educated about this condition as well as treatment options. On today's follow-up after anyway, patient has had almost complete relief of stiffness with improved abduction and forced flexion.  External rotation is still somewhat limited but improved since last visit patient has been very aggressive with HEP and with physical therapy. Continue with current treatment protocol. Follow-up after wrapping up of physical therapy. In a 15 minute assessment and discussion, patient was counseled on continued weight loss, diet, and physical activity relating to this condition. He was educated with options in detail including nutrition, joining a health club/ weight loss program, and use of cardio equipment such as the Arc Trainer and the importance of use as well as range of motion and HEP exercises for weight loss.      Paul Rosenberg DO    .

## 2021-11-30 ENCOUNTER — TREATMENT (OUTPATIENT)
Dept: PHYSICAL THERAPY | Age: 61
End: 2021-11-30
Payer: COMMERCIAL

## 2021-11-30 ENCOUNTER — OFFICE VISIT (OUTPATIENT)
Dept: ORTHOPEDIC SURGERY | Age: 61
End: 2021-11-30
Payer: COMMERCIAL

## 2021-11-30 VITALS — BODY MASS INDEX: 28.62 KG/M2 | WEIGHT: 223 LBS | HEIGHT: 74 IN

## 2021-11-30 DIAGNOSIS — Z71.82 EXERCISE COUNSELING: ICD-10-CM

## 2021-11-30 DIAGNOSIS — M75.01 ADHESIVE CAPSULITIS OF RIGHT SHOULDER: Primary | ICD-10-CM

## 2021-11-30 DIAGNOSIS — M75.101 TEAR OF RIGHT ROTATOR CUFF, UNSPECIFIED TEAR EXTENT, UNSPECIFIED WHETHER TRAUMATIC: ICD-10-CM

## 2021-11-30 PROCEDURE — 97110 THERAPEUTIC EXERCISES: CPT

## 2021-11-30 PROCEDURE — 3017F COLORECTAL CA SCREEN DOC REV: CPT | Performed by: ORTHOPAEDIC SURGERY

## 2021-11-30 PROCEDURE — G8419 CALC BMI OUT NRM PARAM NOF/U: HCPCS | Performed by: ORTHOPAEDIC SURGERY

## 2021-11-30 PROCEDURE — 1036F TOBACCO NON-USER: CPT | Performed by: ORTHOPAEDIC SURGERY

## 2021-11-30 PROCEDURE — 99213 OFFICE O/P EST LOW 20 MIN: CPT | Performed by: ORTHOPAEDIC SURGERY

## 2021-11-30 PROCEDURE — G8484 FLU IMMUNIZE NO ADMIN: HCPCS | Performed by: ORTHOPAEDIC SURGERY

## 2021-11-30 PROCEDURE — G8427 DOCREV CUR MEDS BY ELIG CLIN: HCPCS | Performed by: ORTHOPAEDIC SURGERY

## 2021-11-30 NOTE — PROGRESS NOTES
Physical Therapy Daily Treatment Note    Date: 2021  Patient Name: Shena Escalona  : 1960   MRN: [id number]  DOInjury: 3 months  DOSx: None   Referring Provider:  Mayito Mcmillan DO    Medical Diagnosis:      Diagnosis Orders   1. Adhesive capsulitis of right shoulder         Outcome Measure: Quick Dash: 34% Impairment    S: Pt feeling good today . O: Pt given written HEP  Time 3886- 4495     Visit  4/  Repeat outcome measure at mid point and end.     Pain 6 /10     ROM Right Shoulder:  AROM: 160° Forward elevation,  70° ER,  IR to 40  PROM: 165° Forward elevation,  80° ER,  45° IR 21    Modalities            Manual                 Stretch      Table slides      Wall Walk Flex     Wall Walk ABD     Wall Pec/ER Stretch     Wall ER Stretch     Towel IR Stretch     Cross Body Adduction     Standing Stretch with Arms Behind Head           Exercise      UBE          Pulleys - Flex 3 min  TE   Pulleys - IR 3 min  TE        Supine Wand Flex 15 x 2 set2 with 10s holds with Black wand TE   Supine Wand [place] 15 reps x 2 sets TE   Supine Wand ER/IR 15 x 2 sets with 5s holds with Black wand TE   Standing Wand IR 15 x 2 sets with 5s holds with Black wand TE   Standing Wand Scaption NEXT with holds    Standing Wand Flex      Standing Wand Shoulder Press      Standing Wand ER/IR      Shrugs AROM       Pendulum Ex            Supine Flex 2# 15 reps x 2 sets new   Next  TE   S-lying ABD 2# 15 reps x 2 sets new  Next  TE   S-lying ER 1# 15 reps x 2 sets new  Next  TE         Prone Flexion NEXT with weight     Prone Ext NEXT with weight     Prone Row with ER NEXT with weight     Prone Horizontal ABD NEXT with weight           Standing Flex      Standing ER      Standing IR      Standing Scaption       Standing ABD      Standing Shoulder Press            ROWS: H      ROWS: M     ROWS: L next     ER next     IR next     Punches      Shoulder Press      Shoulder Flex      Shoulder ABD                  Shoulder Flexion with Ball on Wall      Shoulder Flexion with Nuts and Bolts            Weighted Machines      Lat Pull Down      Vertical Row      A:  Tolerated well. Pt able to tolerate the progression to 15 reps x 2 sets as  Listed with ex's  along with newly added wt ex's .     P: Continue with rehab plan  Mitesh Fernando PTA    Treatment Charges: Mins Units   Initial Evaluation     Re-Evaluation     Ther Exercise         TE 40 3   Manual Therapy     MT     Ther Activities        TA     Gait Training          GT     Neuro Re-education NR     Modalities     Non-Billable Service Time     Other     Total Time/Units 40 3

## 2021-12-06 ENCOUNTER — TREATMENT (OUTPATIENT)
Dept: PHYSICAL THERAPY | Age: 61
End: 2021-12-06
Payer: COMMERCIAL

## 2021-12-06 DIAGNOSIS — M75.01 ADHESIVE CAPSULITIS OF RIGHT SHOULDER: Primary | ICD-10-CM

## 2021-12-06 PROCEDURE — 97110 THERAPEUTIC EXERCISES: CPT | Performed by: PHYSICAL THERAPIST

## 2021-12-06 NOTE — PROGRESS NOTES
Physical Therapy Daily Treatment Note    Date: 2021  Patient Name: Oskar Duran  : 1960   MRN: [id number]  DOInjury: 3 months  DOSx: None   Referring Provider:  Myles Joshi DO    Medical Diagnosis:      Diagnosis Orders   1. Adhesive capsulitis of right shoulder         Outcome Measure: Quick Dash: 34% Impairment    S: Pt feeling good better with pain less frequent but still having pain at night. O: Pt given written HEP  Time 800-840     Visit  5/    Repeat outcome measure at mid point and end.     Pain 0-6 /10     ROM Right Shoulder:  AROM: 160° Forward elevation,  70° ER,  IR to 40  PROM: 165° Forward elevation,  80° ER,  45° IR 21    Modalities            Manual                 Stretch      Table slides      Wall Walk Flex     Wall Walk ABD     Wall Pec/ER Stretch     Wall ER Stretch     Towel IR Stretch     Cross Body Adduction     Standing Stretch with Arms Behind Head           Exercise      UBE     3/3     Pulleys - Flex   NEXT ABDNEXT with holds TE   Pulleys - IR NEXT with holds TE        Supine Wand Flex  TE   Supine Wand Bench Press  TE   Supine Wand ER/IR  TE   Standing Wand IR  TE   Standing Wand Scaption NEXT with holds    Standing Wand Flex      Standing Wand Shoulder Press      Standing Wand ER/IR      Shrugs AROM       Pendulum Ex            Supine Flex 3# 15 reps x 1 set with 5s holds    TE   S-lying ABD 3# 15 reps x 1 sets with 5s holds  TE   S-lying ER 2# 15 reps x 2 sets   TE         Prone Flexion 3# 15 reps x 1 set      Prone Ext 3# 15 reps x 1 set      Prone Row with ER 2# 15 reps x 1 set     Prone Horizontal ABD            Standing Flex      Standing ER      Standing IR      Standing Scaption       Standing ABD      Standing Shoulder Press            ROWS: H      ROWS: M     ROWS: L next     ER next     IR next     Punches      Shoulder Press      Shoulder Flex      Shoulder ABD                  Shoulder Flexion with Ball on Wall      Shoulder Flexion with Nuts and Bolts            Weighted Machines      Lat Pull Down      Vertical Row      A:  Tolerated well with mild-moderate pain.  Pt still especially painful with ER and stiff with IR.   P: Continue with rehab plan  Yobany Solano PT    Treatment Charges: Mins Units   Initial Evaluation     Re-Evaluation     Ther Exercise         TE 40 3   Manual Therapy     MT     Ther Activities        TA     Gait Training          GT     Neuro Re-education NR     Modalities     Non-Billable Service Time     Other     Total Time/Units 40 3

## 2021-12-13 ENCOUNTER — TREATMENT (OUTPATIENT)
Dept: PHYSICAL THERAPY | Age: 61
End: 2021-12-13
Payer: COMMERCIAL

## 2021-12-13 DIAGNOSIS — M75.01 ADHESIVE CAPSULITIS OF RIGHT SHOULDER: Primary | ICD-10-CM

## 2021-12-13 PROCEDURE — 97110 THERAPEUTIC EXERCISES: CPT | Performed by: PHYSICAL THERAPIST

## 2021-12-13 NOTE — PROGRESS NOTES
Physical Therapy Daily Treatment Note    Date: 2021  Patient Name: Fanny Lee  : 1960   MRN: [id number]  DOInjury: 3 months  DOSx: None   Referring Provider:  Gabriela Boyle DO    Medical Diagnosis:      Diagnosis Orders   1. Adhesive capsulitis of right shoulder         Outcome Measure: Quick Dash: 34% Impairment    S: Pt stated that he heard a pop when stretching and that he had moderate pain for a day or so but that his pain level has returned to baseline or less since. O: Pt given written HEP  Time 800-840     Visit  6/    Repeat outcome measure at mid point and end.     Pain 0-4 /10     ROM Right Shoulder:  AROM: 160° Forward elevation,  70° ER,  IR to 40  PROM: 165° Forward elevation,  80° ER,  45° IR 21    Modalities            Manual                 Stretch      Table slides      Wall Walk Flex     Wall Walk ABD     Wall Pec/ER Stretch     Wall ER Stretch     Towel IR Stretch     Cross Body Adduction     Standing Stretch with Arms Behind Head           Exercise      UBE          Pulleys - Abd 3 min  NEXT ABDNEXT with holds TE   Pulleys - IR 3 minNEXT with holds TE        Supine Wand Flex  TE   Supine Wand Bench Press  TE   Supine Wand ER/IR  TE   Standing Wand IR  TE   Standing Wand Scaption NEXT with holds    Standing Wand Flex      Standing Wand Shoulder Press      Standing Wand ER/IR      Shrugs AROM       Pendulum Ex            Supine Flex  TE   S-lying ABD  TE   S-lying ER  TE        Prone Flexion     Prone Ext     Prone Row with ER     Prone Horizontal ABD            Standing Flex 3# 15 reps x 1 set Increase to 4 or 5#    Standing ER 3# 15 reps x 1 set \"    Standing IR 3# 15 reps x 1 set  \"    Standing Scaption   \"    Standing ABD 3# 15 reps x 1 set  To 90° \"    Standing Shoulder Press 3# 15 reps x 1 set  \"          ROWS: H Black 2 x 15Gray NEXT    ROWS: M Black 2 x 15\"    ROWS: L      ER Black 2 x 15 \"    IR Black 2 x 15 \"    Punches      Shoulder Press      Shoulder Flex Shoulder ABD                  Shoulder Flexion with Ball on Wall      Shoulder Flexion with Nuts and Bolts            Weighted Machines      Lat Pull Down      Vertical Row      A:  Tolerated well with mild-moderate pain. Pt improving strength and ROM and tolerance for function.    P: Continue with rehab plan  Jorge Wright PT    Treatment Charges: Mins Units   Initial Evaluation     Re-Evaluation     Ther Exercise         TE 40 3   Manual Therapy     MT     Ther Activities        TA     Gait Training          GT     Neuro Re-education NR     Modalities     Non-Billable Service Time     Other     Total Time/Units 40 3

## 2021-12-20 ENCOUNTER — TREATMENT (OUTPATIENT)
Dept: PHYSICAL THERAPY | Age: 61
End: 2021-12-20
Payer: COMMERCIAL

## 2021-12-20 DIAGNOSIS — M75.01 ADHESIVE CAPSULITIS OF RIGHT SHOULDER: Primary | ICD-10-CM

## 2021-12-20 PROCEDURE — 97110 THERAPEUTIC EXERCISES: CPT

## 2021-12-20 NOTE — PROGRESS NOTES
Physical Therapy Daily Treatment Note    Date: 2021  Patient Name: Maryan Bhatia  : 1960   MRN: [id number]  DOInjury: 3 months  DOSx: None   Referring Provider:  Joslyn Rodriguez DO    Medical Diagnosis:      Diagnosis Orders   1. Adhesive capsulitis of right shoulder         Outcome Measure: Quick Dash: 34% Impairment    S: Pt reports doing better since the start of PT , but just still has problems with IR motion . Pt given info on how to order own home pulleys system to work on range of motion at home also. O: Pt given written HEP  Time 672-4813 am     Visit  8/    Repeat outcome measure at mid point and end.     Pain 0-4 /10     ROM Right Shoulder:  AROM: 165° Forward elevation,  70° ER,  IR to 40  PROM: 165° Forward elevation,  80° ER,  45° IR 21    Modalities            Manual                 Stretch      Table slides      Wall Walk Flex     Wall Walk ABD     Wall Pec/ER Stretch     Wall ER Stretch     Towel IR Stretch     Cross Body Adduction     Standing Stretch with Arms Behind Head           Exercise      UBE     3/3     Pulleys - Abd 3 min  NEXT ABDNEXT with holds TE   Pulleys - IR 3 minNEXT with holds TE        Supine Wand Flex  TE   Supine Wand Bench Press  TE   Supine Wand ER/IR  TE   Standing Wand IR  TE   Standing Wand Scaption NEXT with holds    Standing Wand Flex      Standing Wand Shoulder Press      Standing Wand ER/IR      Shrugs AROM       Pendulum Ex            Supine Flex  TE   S-lying ABD  TE   S-lying ER  TE        Prone Flexion     Prone Ext     Prone Row with ER     Prone Horizontal ABD            Standing Flex 3# 15 reps x 1 set Increase to 4 or 5#    Standing ER 3# 15 reps x 1 set \"    Standing IR 3# 15 reps x 1 set  \"    Standing Scaption   \"    Standing ABD 3# 15 reps x 1 set  To 90° \"    Standing Shoulder Press 3# 15 reps x 1 set  \"          ROWS: H Black 2 x 15Gray NEXT    ROWS: M Black 2 x 15\"    ROWS: L      ER Black 2 x 15 \"    IR Black 2 x 15 \"    Jennifer Black 2 x 15      Shoulder Press      Shoulder Flex      Shoulder ABD                  Shoulder Flexion with Ball on Wall      Shoulder Flexion with Nuts and Bolts            Weighted Machines      Lat Pull Down      Vertical Row      A:  Tolerated well  Pt progressing well in all AROM. David Stevens P: Continue with rehab plan  . RTD 12/28/2021   ( 24 total visits available )  Debbie , PTA    Treatment Charges: Mins Units   Initial Evaluation     Re-Evaluation     Ther Exercise         TE 40 3   Manual Therapy     MT     Ther Activities        TA     Gait Training          GT     Neuro Re-education NR     Modalities     Non-Billable Service Time     Other     Total Time/Units 40 3

## 2021-12-22 ENCOUNTER — TREATMENT (OUTPATIENT)
Dept: PHYSICAL THERAPY | Age: 61
End: 2021-12-22
Payer: COMMERCIAL

## 2021-12-22 DIAGNOSIS — M75.01 ADHESIVE CAPSULITIS OF RIGHT SHOULDER: Primary | ICD-10-CM

## 2021-12-22 PROCEDURE — 97110 THERAPEUTIC EXERCISES: CPT

## 2021-12-22 NOTE — PROGRESS NOTES
Physical Therapy Daily Treatment Note    Date: 2021  Patient Name: Conrad Mtz  : 1960   MRN: [id number]  DOInjury: 3 months  DOSx: None   Referring Provider:  Morris Chavarria DO    Medical Diagnosis:      Diagnosis Orders   1. Adhesive capsulitis of right shoulder         Outcome Measure: Quick Dash: 34% Impairment    S: Pt reports general soreness today . O: Pt given written HEP  Time 9488-1473 am     Visit  9/    Repeat outcome measure at mid point and end.     Pain 0-4 /10     ROM Right Shoulder:  AROM: 165° Forward elevation,  70° ER,  IR to 40  PROM: 165° Forward elevation,  80° ER,  45° IR 21    Modalities            Manual                 Stretch      Table slides      Wall Walk Flex     Wall Walk ABD     Wall Pec/ER Stretch     Wall ER Stretch     Towel IR Stretch     Cross Body Adduction     Standing Stretch with Arms Behind Head           Exercise      UBE     3/3     Pulleys - Abd 3 min  NEXT ABDNEXT with holds TE   Pulleys - IR 3 minNEXT with holds TE        Supine Wand Flex  TE   Supine Wand Bench Press  TE   Supine Wand ER/IR  TE   Standing Wand IR  TE   Standing Wand Scaption NEXT with holds    Standing Wand Flex      Standing Wand Shoulder Press      Standing Wand ER/IR      Shrugs AROM       Pendulum Ex            Supine Flex  TE   S-lying ABD  TE   S-lying ER  TE        Prone Flexion     Prone Ext     Prone Row with ER     Prone Horizontal ABD            Standing Flex 3# 15 reps x 1 set Increase to 4 or 5#    Standing ER 3# 15 reps x 1 set \"    Standing IR 3# 15 reps x 1 set  \"    Standing Scaption   \"    Standing ABD 3# 15 reps x 1 set  To 90° \"    Standing Shoulder Press 3# 15 reps x 1 set  \"          ROWS: Oscar Nascimento 2 x 15Gray NEXT    ROWS: Annela Medicus 2 x 15\"    ROWS: L      ER Black  2 x 15 \"    IR Black 2 x 15 \"    Punches    Presley 2 x 15      tricep ext  Gray 2 x 15 new      Bicep curls  Gray 2 x 15 new     Shoulder Press      Shoulder Flex      Shoulder ABD Shoulder Flexion with Ball on Wall      Shoulder Flexion with Nuts and Bolts            Weighted Machines      Lat Pull Down      Vertical Row      A:  Tolerated well  Pt  Able to tolerate the progression from black to gray t tubing as listed above . P: Continue with rehab plan  . RTD 12/28/2021   ( 24 total visits available )  Salvador Rosa PTA    Treatment Charges: Mins Units   Initial Evaluation     Re-Evaluation     Ther Exercise         TE 40 3   Manual Therapy     MT     Ther Activities        TA     Gait Training          GT     Neuro Re-education NR     Modalities     Non-Billable Service Time     Other     Total Time/Units 40 3

## 2021-12-28 ENCOUNTER — TREATMENT (OUTPATIENT)
Dept: PHYSICAL THERAPY | Age: 61
End: 2021-12-28
Payer: COMMERCIAL

## 2021-12-28 DIAGNOSIS — M75.01 ADHESIVE CAPSULITIS OF RIGHT SHOULDER: Primary | ICD-10-CM

## 2021-12-28 PROCEDURE — 97110 THERAPEUTIC EXERCISES: CPT

## 2021-12-28 NOTE — PROGRESS NOTES
Physical Therapy Daily Treatment Note    Date: 2021  Patient Name: Forrest Grewal  : 1960   MRN: [id number]  DOInjury: 3 months  DOSx: None   Referring Provider:  Annemarie Acosta DO    Medical Diagnosis:      Diagnosis Orders   1. Adhesive capsulitis of right shoulder         Outcome Measure: Quick Dash: 34% Impairment    Mid point Quick Dash score 2021= 27%     S: Pt reports general soreness today . O: Pt given written HEP  Time 0504-7399 am     Visit  10 / 24 visits available   Repeat outcome measure at mid point and end.     Pain 0-4 /10     ROM Right Shoulder:  AROM: 165° Forward elevation,  70° ER,  IR to 40  PROM: 165° Forward elevation,  80° ER,  45° IR 21    Modalities            Manual                 Stretch      Table slides      Wall Walk Flex     Wall Walk ABD     Wall Pec/ER Stretch     Wall ER Stretch     Towel IR Stretch     Cross Body Adduction     Standing Stretch with Arms Behind Head           Exercise      UBE     3/3     Pulleys - Abd 3 min  NEXT ABDNEXT with holds TE   Pulleys - IR 3 minNEXT with holds TE        Supine Wand Flex  TE   Supine Wand Bench Press  TE   Supine Wand ER/IR  TE   Standing Wand IR  TE   Standing Wand Scaption NEXT with holds    Standing Wand Flex      Standing Wand Shoulder Press      Standing Wand ER/IR      Shrugs AROM       Pendulum Ex            Supine Flex  TE   S-lying ABD  TE   S-lying ER  TE        Prone Flexion     Prone Ext     Prone Row with ER     Prone Horizontal ABD            Standing Flex 3# 15 reps x 1 set Increase to 4 or 5#    Standing ER 3# 15 reps x 1 set \"    Standing IR 3# 15 reps x 1 set  \"    Standing Scaption   \"    Standing ABD 3# 15 reps x 1 set  To 90° \"    Standing Shoulder Press 3# 15 reps x 1 set  \"          ROWS: H   Presley 2 x 15Gray NEXT    ROWS: Clearnce Davenport 2 x 15\"    ROWS: L      ER Black  2 x 15 \"    IR Black 2 x 15 \"    Punches    Presley 2 x 15      tricep ext  Gray 2 x 15      Bicep curls  Gray 2 x 15 Shoulder Press      Shoulder Flex      Shoulder ABD 90 deg IR  3# 15 reps x 2 sets new                  Shoulder Flexion with Ball on Wall      Shoulder Flexion with Nuts and Bolts            Weighted Machines      Lat Pull Down      Vertical Row      A:  Tolerated well  Pt  Able to tolerate the progression from black to gray t tubing as listed above . P: Continue with rehab plan  . RTD  changed to 12/29/2021    ( 24 total visits available ) next visit 2/4 2022   Joan Rebolledo PTA    Treatment Charges: Mins Units   Initial Evaluation     Re-Evaluation     Ther Exercise         TE 40 3   Manual Therapy     MT     Ther Activities        TA     Gait Training          GT     Neuro Re-education NR     Modalities     Non-Billable Service Time     Other     Total Time/Units 40 3

## 2021-12-29 ENCOUNTER — OFFICE VISIT (OUTPATIENT)
Dept: ORTHOPEDIC SURGERY | Age: 61
End: 2021-12-29
Payer: COMMERCIAL

## 2021-12-29 VITALS — BODY MASS INDEX: 28.62 KG/M2 | TEMPERATURE: 98 F | WEIGHT: 223 LBS | HEIGHT: 74 IN

## 2021-12-29 DIAGNOSIS — M75.01 ADHESIVE CAPSULITIS OF RIGHT SHOULDER: Primary | ICD-10-CM

## 2021-12-29 PROCEDURE — 99213 OFFICE O/P EST LOW 20 MIN: CPT | Performed by: ORTHOPAEDIC SURGERY

## 2021-12-29 PROCEDURE — G8427 DOCREV CUR MEDS BY ELIG CLIN: HCPCS | Performed by: ORTHOPAEDIC SURGERY

## 2021-12-29 PROCEDURE — G8419 CALC BMI OUT NRM PARAM NOF/U: HCPCS | Performed by: ORTHOPAEDIC SURGERY

## 2021-12-29 PROCEDURE — 3017F COLORECTAL CA SCREEN DOC REV: CPT | Performed by: ORTHOPAEDIC SURGERY

## 2021-12-29 PROCEDURE — 1036F TOBACCO NON-USER: CPT | Performed by: ORTHOPAEDIC SURGERY

## 2021-12-29 PROCEDURE — G8484 FLU IMMUNIZE NO ADMIN: HCPCS | Performed by: ORTHOPAEDIC SURGERY

## 2021-12-29 RX ORDER — OMEPRAZOLE 20 MG/1
20 CAPSULE, DELAYED RELEASE ORAL 2 TIMES DAILY
Qty: 30 CAPSULE | Refills: 3 | Status: SHIPPED | OUTPATIENT
Start: 2021-12-29

## 2021-12-29 RX ORDER — NAPROXEN 500 MG/1
500 TABLET ORAL 2 TIMES DAILY WITH MEALS
Qty: 60 TABLET | Refills: 0 | Status: SHIPPED
Start: 2021-12-29 | End: 2022-02-23

## 2021-12-29 NOTE — PROGRESS NOTES
Chief Complaint   Patient presents with    Shoulder Pain     Right shoulder LASHAE follow up. Still in PT. shoulder is sore but he thinks its from PT making him work the shoulder. HPI:    Patient is 57 y.o. male complaining of atraumatic insidious onset of Right shoulder pain and weakness for 3-4 months. Patient does not recall an injury but noticed that he had progressive weakness. He is right-hand-dominant but noticed that her left shoulder was getting weak when reaching out for such things as pens or pieces of paper. Patient is now post op from LASHAE on 11/09/2021. He is improving and currently in PT.        ROS:    Skin: (-) rash,(-) psoriasis,(-) eczema, (-)skin cancer. Neurologic: (-)numbness, (-)tingling, (-)headaches, (-) LOC. Cardiovascular: (-) Chest pain, (-) swelling in legs/feet, (-) SOB, (-) cramping in legs/feet with walking.     All other review of systems negative except stated above or in HPI      Past Medical History:   Diagnosis Date    Diabetes mellitus ([facility] [address]     Environmental and seasonal allergies     Hyperlipidemia     Shingles      Past Surgical History:   Procedure Laterality Date    CLOSED MANIPULATION SHOULDER Left 5/14/2019    LEFT SHOULDER MANIPULATION UNDER ANESTHESIA performed by Tracie Franklin DO at [address] [place] [address]    SHOULDER SURGERY Left 05/14/2019    manipulation     SHOULDER SURGERY Right 11/9/2021    RIGHT SHOULDER MANIPULATION UNDER ANESTHESIA performed by Tracie Franklin DO at [facility]       Current Outpatient Medications:     naproxen (NAPROSYN) 500 MG tablet, Take 1 tablet by mouth 2 times daily (with meals), Disp: 60 tablet, Rfl: 0    Omega-3 Fatty Acids (FISH OIL) 1000 MG CAPS, Take 1,000 mg by mouth 2 times daily, Disp: , Rfl:     glimepiride (AMARYL) 2 MG tablet, Take by mouth every morning (before breakfast) , Disp: , Rfl:     TRUE METRIX BLOOD GLUCOSE TEST strip, , Disp: , Rfl:    TRUEplus Lancets 33G MISC, , Disp: , Rfl:     SITagliptin (JANUVIA) 25 MG tablet, Take 25 mg by mouth daily, Disp: , Rfl:     rosuvastatin (CRESTOR) 10 MG tablet, Take 10 mg by mouth daily , Disp: , Rfl: 5    metFORMIN (GLUCOPHAGE) 1000 MG tablet, Take 1,000 mg by mouth 2 times daily (with meals), Disp: , Rfl:     montelukast (SINGULAIR) 10 MG tablet, Take 10 mg by mouth nightly, Disp: , Rfl:   Allergies   Allergen Reactions    Other      Canteloupe gives him hives     Social History     Socioeconomic History    Marital status:      Spouse name: Not on file    Number of children: Not on file    Years of education: Not on file    Highest education level: Not on file   Occupational History    Not on file   Tobacco Use    Smoking status: Never Smoker    Smokeless tobacco: Never Used   Vaping Use    Vaping Use: Never used   Substance and Sexual Activity    Alcohol use: Yes     Comment: social    Drug use: No    Sexual activity: Yes     Partners: Female   Other Topics Concern    Not on file   Social History Narrative    Not on file     Social Determinants of Health     Financial Resource Strain:     Difficulty of Paying Living Expenses: Not on file   Food Insecurity:     Worried About Running Out of Food in the Last Year: Not on file    Risa of Food in the Last Year: Not on file   Transportation Needs:     Lack of Transportation (Medical): Not on file    Lack of Transportation (Non-Medical):  Not on file   Physical Activity:     Days of Exercise per Week: Not on file    Minutes of Exercise per Session: Not on file   Stress:     Feeling of Stress : Not on file   Social Connections:     Frequency of Communication with Friends and Family: Not on file    Frequency of Social Gatherings with Friends and Family: Not on file    Attends Restorationism Services: Not on file    Active Member of Clubs or Organizations: Not on file    Attends Club or Organization Meetings: Not on file    Marital Status: Not on file   Intimate Partner Violence:     Fear of Current or Ex-Partner: Not on file    Emotionally Abused: Not on file    Physically Abused: Not on file    Sexually Abused: Not on file   Housing Stability:     Unable to Pay for Housing in the Last Year: Not on file    Number of [place] in the Last Year: Not on file    Unstable Housing in the Last Year: Not on file     No family history on file. Physical Exam:    There were no vitals taken for this visit. GENERAL: alert, appears stated age, cooperative, no acute distress    HEENT: Head is normocephalic, atraumatic. PERRLA. SKIN: Clean, dry, intact. There is not any cellulitis or cutaneous lesions noted in the lower extremities    PULMONARY: breathing is regular and unlabored, no acute distress    CV: The bilateral upper and lower extremities are warm and well-perfused with brisk capillary refill. 2+ pulses UE and LE bilateral.     PSYCHIATRY: Pleasant mood, appropriate behavior, follows commands    NEURO: Sensation is intact distally with light touch with no alteration. Motor exam of the lower extremities show quadriceps, hamstrings, foot dorsiflexion and plantarflexion grossly intact 5/5. LYMPH: No lymphedema present distally in upper or lower extremity. MUSCULOSKELETAL:  Shoulder Exam:  Examination of the Right shoulder shows: There is not a deformity. There is not erythema. There is not soft tissue swelling. Deltoid region is  tender to palpation. AC Joint is  tender to palpation. Clavicle is not tender to palpation. Bicipital Groove is not tender to palpation. Pectoralis  is not tender to palpation. Scapula/ trapezius is  tender to palpation. There is  weakness with supraspinatus testing. There is  pain with supraspinatus testing. Yergason Test negative. Drop Arm Test positive. Apprehension Test not tested. Cross Arm Test positive. Supraspinatus Test positive.   Shoulder ROM- 180/180/50        Imaging:  EXAMINATION:   THREE XRAY VIEWS OF THE RIGHT SHOULDER       6/30/2021 6:35 pm       COMPARISON:   None.       HISTORY:   ORDERING SYSTEM PROVIDED HISTORY: Right shoulder pain, unspecified chronicity       FINDINGS:   Glenohumeral joint is normally aligned.  No evidence of acute fracture or   dislocation.  No abnormal periarticular calcifications.  AC joint   degenerative changes.       Visualized lung is unremarkable.           Impression   No acute abnormality.       AC joint degenerative changes.          EXAMINATION:   MRI OF THE RIGHT SHOULDER WITHOUT CONTRAST   10/27/2021 9:48 am       TECHNIQUE:   Multiplanar multisequence MRI of the right shoulder was performed without the   administration of intravenous contrast.       COMPARISON:   Right shoulder x-rays 06/30/2021       HISTORY:   ORDERING SYSTEM PROVIDED HISTORY: Tear of right rotator cuff, unspecified   tear extent, unspecified whether traumatic       FINDINGS:   ROTATOR CUFF: Low-grade partial-thickness bursal-surface tear involving   supraspinatus tendon mid fibers in region of critical zone with interstitial   extension extending to attachment on greater tuberosity (best appreciated   image 12, coronal fat-suppressed T2-weighted sequence, series 5).  Remainder   of the rotator cuff appears intact.  Mild superimposed tendinosis to the   supraspinatus as well as the infraspinatus and subscapularis tendons.  Muscle   mass and muscle signal intensities are maintained.       BICEPS TENDON: Long head biceps tendon appears intact, appropriately located   and normal in appearance.  Mild tenosynovitis.       LABRUM: Degeneration and tearing of the inferior labrum.  No paralabral cyst.       GLENOHUMERAL JOINT: No degenerative changes noted.  No joint effusion or   intra-articular loose bodies.  Thickening and intrinsic T2 hyperintensity of   the inferior capsuloligamentous structures of the glenohumeral joint without   focal discontinuity noted.  Minimal pericapsular edema inferiorly.       AC JOINT AND ACROMIOCLAVICULAR ARCH: Mild degenerative changes to the [facility]   joint.  No findings for [facility] joint separation.  Acromiohumeral interval is   within normal limits.  No significant fluid in the subacromial/subdeltoid   bursa.       BONE MARROW: No evidence for occult fracture.  No suspicious focal bony   lesions.       OUTLET SPACES: No focal abnormalities are seen in the outlet spaces.           Impression   Low-grade partial-thickness bursal-surface tear of supraspinatus tendon with   interstitial extension extending distally towards greater tuberosity   attachment.  Mild superimposed tendinosis to the tendon as well as to the   infraspinatus and subscapularis tendons.       Degeneration and tearing of the inferior labrum without paralabral cyst noted.       Thickening and abnormal intrinsic signal change to the inferior   capsuloligamentous structures of the glenohumeral joint without focal   discontinuity noted.  Minimal adjacent pericapsular edema.  Findings are   nonspecific but can be seen in the setting of adhesive capsulitis.       Mild tenosynovitis to the long head biceps tendon. Mansoor Cerrato was seen today for shoulder pain. Diagnoses and all orders for this visit:    Adhesive capsulitis of right shoulder    Other orders  -     naproxen (NAPROSYN) 500 MG tablet; Take 1 tablet by mouth 2 times daily (with meals)  -     omeprazole (PRILOSEC) 20 MG delayed release capsule; Take 1 capsule by mouth 2 times daily          Patient seen and examined. X-rays reviewed. Patient has had insidious onset chronic pain and weakness of Right shoulder. MRI reviewed today showing signs of rotator cuff tendinopathy but more importantly adhesive capsulitis. Exam was consistent with adhesive capsulitis. Patient was educated about this condition as well as treatment options.  On today's follow-up after anyway, patient has had almost complete relief of stiffness with improved abduction and forward flexion. External rotation is still somewhat limited but improved since last visit patient has been very aggressive with HEP and with physical therapy. Continue with current treatment protocol. Follow-up after wrapping up of physical therapy. In a 15 minute assessment and discussion, patient was counseled on continued weight loss, diet, and physical activity relating to this condition. He was educated with options in detail including nutrition, joining a health club/ weight loss program, and use of cardio equipment such as the Arc Trainer and the importance of use as well as range of motion and HEP exercises for weight loss.      Morris Chavarria, DO

## 2022-01-04 ENCOUNTER — TREATMENT (OUTPATIENT)
Dept: PHYSICAL THERAPY | Age: 62
End: 2022-01-04
Payer: COMMERCIAL

## 2022-01-04 DIAGNOSIS — M75.01 ADHESIVE CAPSULITIS OF RIGHT SHOULDER: Primary | ICD-10-CM

## 2022-01-04 PROCEDURE — 97110 THERAPEUTIC EXERCISES: CPT

## 2022-01-04 NOTE — PROGRESS NOTES
Physical Therapy Daily Treatment Note    Date: 2022  Patient Name: Elvin Sousa  : 1960   MRN: [id number]  DOInjury: 3 months  DOSx: None   Referring Provider:  Javier Nettles DO    Medical Diagnosis:      Diagnosis Orders   1. Adhesive capsulitis of right shoulder         Outcome Measure: Quick Dash: 34% Impairment    Mid point Quick Dash score 2021= 27%     S: Pt reports general soreness today . O: Pt given written HEP  Time 0730-2763      Visit   visits available   Repeat outcome measure at mid point and end.     Pain 0-4 /10     ROM Right Shoulder:  AROM: 165° Forward elevation,  70° ER,  IR to 40  PROM: 165° Forward elevation,  80° ER,  45° IR 2022    Modalities            Manual                 Stretch      Table slides      Wall Walk Flex     Wall Walk ABD     Wall Pec/ER Stretch     Wall ER Stretch     Towel IR Stretch     Cross Body Adduction     Standing Stretch with Arms Behind Head           Exercise      UBE     3/3     Pulleys - Abd NEXT with holds TE   Pulleys - IR NEXT with holds TE        Supine Wand Flex  TE   Supine Wand Bench Press  TE   Supine Wand ER/IR  TE   Standing Wand IR  TE   Standing Wand Scaption NEXT with holds    Standing Wand Flex      Standing Wand Shoulder Press      Standing Wand ER/IR      Shrugs AROM       Pendulum Ex            Supine Flex  TE   S-lying ABD  TE   S-lying ER  TE        Prone Flexion     Prone Ext     Prone Row with ER     Prone Horizontal ABD            Standing Flex  4# 15 reps x 1 set Increase to 4 or 5#    Standing ER   4# 15 reps x 1 set \"    Standing IR   4# 15 reps x 1 set  \"    Standing Scaption   \"    Standing ABD  4# 15 reps x 1 set  To 90° \"    Standing Shoulder Press   4#15 reps x 1 set  \"          ROWS: H   Presley 2 x 15Gray NEXT    ROWS: Urvashi Bora 2 x 15\"    ROWS: L      ER Black  2 x 15 \"    IR Black 2 x 15 \"    Punches    Presley 2 x 15      tricep ext  Gray 2 x 15      Bicep curls  Gray 2 x 15      Shoulder Press Shoulder Flex      Shoulder ABD 90 deg IR    4# 15 reps x 2 sets       D1+ D2  Black 15 reps x 2 sets            Shoulder Flexion with Ball on Wall      Shoulder Flexion with Nuts and Bolts            Weighted Machines      Lat Pull Down      Vertical Row      A:  Tolerated well   Pt able to tolerate the progression from 3# to 4# also resistive PNF patterns with t tubing . as listed above . P: Continue with rehab plan  .  Pt will be OOT next return visit will be 1/20/2022     ( 24 total visits available ) next  RTD  visit 2/23 /2022   Jass Berry PTA    Treatment Charges: Mins Units   Initial Evaluation     Re-Evaluation     Ther Exercise         TE 40 3   Manual Therapy     MT     Ther Activities        TA     Gait Training          GT     Neuro Re-education NR     Modalities     Non-Billable Service Time     Other     Total Time/Units 40 3

## 2022-01-20 ENCOUNTER — TREATMENT (OUTPATIENT)
Dept: PHYSICAL THERAPY | Age: 62
End: 2022-01-20
Payer: COMMERCIAL

## 2022-01-20 DIAGNOSIS — M75.01 ADHESIVE CAPSULITIS OF RIGHT SHOULDER: Primary | ICD-10-CM

## 2022-01-20 PROCEDURE — 97110 THERAPEUTIC EXERCISES: CPT

## 2022-01-20 NOTE — PROGRESS NOTES
Physical Therapy Daily Treatment Note    Date: 2022  Patient Name: Madina Puente  : 1960   MRN: <[id number]>  DOInjury: 3 months  DOSx: None   Referring Provider:  Alfonso Hernandez DO    Medical Diagnosis:      Diagnosis Orders   1. Adhesive capsulitis of right shoulder         Outcome Measure: Quick Dash: 34% Impairment    Mid point Quick Dash score 2021= 27%     S: Pt reports general soreness today . O: Pt given written HEP  Time [address] am     Visit   visits available   Repeat outcome measure at mid point and end.     Pain 0-4 /10     ROM Right Shoulder:  AROM: 165° Forward elevation,  70° ER,  IR to 40  PROM: 165° Forward elevation,  80° ER,  45° IR 2022    Modalities            Manual                 Stretch      Table slides      Wall Walk Flex     Wall Walk ABD     Wall Pec/ER Stretch     Wall ER Stretch     Towel IR Stretch     Cross Body Adduction     Standing Stretch with Arms Behind Head           Exercise      UBE     3/3     Pulleys - Abd NEXT with holds TE   Pulleys - IR NEXT with holds TE        Supine Wand Flex  TE   Supine Wand Bench Press  TE   Supine Wand ER/IR  TE   Standing Wand IR  TE   Standing Wand Scaption NEXT with holds    Standing Wand Flex      Standing Wand Shoulder Press      Standing Wand ER/IR      Shrugs AROM       Pendulum Ex            Supine Flex  TE   S-lying ABD  TE   S-lying ER  TE        Prone Flexion     Prone Ext     Prone Row with ER     Prone Horizontal ABD            Standing Flex  4# 15 reps x 1 set Increase to 4 or 5#    Standing ER   4# 15 reps x 1 set \"    Standing IR   4# 15 reps x 1 set  \"    Standing Scaption   \"    Standing ABD  4# 15 reps x 1 set  To 90° \"    Standing Shoulder Press   4#15 reps x 1 set  \"          ROWS: H   Presley 2 x 15Gray NEXT    ROWS: Bong Lovings 2 x 15\"    ROWS: L      ER Black  2 x 15 \"    IR Black 2 x 15 \"    Punches    Presley 2 x 15      tricep ext  Gray 2 x 15      Bicep curls  Gray 2 x 15      Shoulder Press Shoulder Flex      Shoulder ABD 90 deg IR    4# 15 reps x 2 sets       D1+ D2  Black 15 reps x 2 sets            Shoulder Flexion with Ball on Wall      Shoulder Flexion with Nuts and Bolts            Weighted Machines      Lat Pull Down      Vertical Row      A:  Tolerated well   . Pt able to tolerate all ex's . . as listed above . P: Continue with rehab plan  .    ( 24 total visits available ) next  RTD  visit 2/23 /2022   Jo Lima PTA    Treatment Charges: Mins Units   Initial Evaluation     Re-Evaluation     Ther Exercise         TE 40 3   Manual Therapy     MT     Ther Activities        TA     Gait Training          GT     Neuro Re-education NR     Modalities     Non-Billable Service Time     Other     Total Time/Units 40 3

## 2022-01-28 ENCOUNTER — TREATMENT (OUTPATIENT)
Dept: PHYSICAL THERAPY | Age: 62
End: 2022-01-28
Payer: COMMERCIAL

## 2022-01-28 DIAGNOSIS — M75.01 ADHESIVE CAPSULITIS OF RIGHT SHOULDER: Primary | ICD-10-CM

## 2022-01-28 PROCEDURE — 97110 THERAPEUTIC EXERCISES: CPT

## 2022-01-28 NOTE — PROGRESS NOTES
Physical Therapy Daily Treatment Note    Date: 2022  Patient Name: Ernesto Flores  : 1960   MRN: [id number]  DOInjury: 3 months  DOSx: None   Referring Provider:  Abisai Portillo DO    Medical Diagnosis:      Diagnosis Orders   1. Adhesive capsulitis of right shoulder         Outcome Measure: Quick Dash: 34% Impairment    Mid point Quick Dash score 2021= 27%     S: Pt reports general soreness today . O: Pt given written HEP  Time 0800- 0840 am     Visit   visits available   Repeat outcome measure at mid point and end.     Pain 0-4 /10     ROM Right Shoulder:  AROM: 165° Forward elevation,  70° ER,  IR to 40  PROM: 165° Forward elevation,  80° ER,  45° IR 2022    Modalities            Manual                 Stretch      Table slides      Wall Walk Flex     Wall Walk ABD     Wall Pec/ER Stretch     Wall ER Stretch     Towel IR Stretch     Cross Body Adduction     Standing Stretch with Arms Behind Head           Exercise      UBE     3/3     Pulleys - Abd NEXT with holds TE   Pulleys - IR NEXT with holds TE        Supine Wand Flex  TE   Supine Wand Bench Press  TE   Supine Wand ER/IR  TE   Standing Wand IR  TE   Standing Wand Scaption NEXT with holds    Standing Wand Flex      Standing Wand Shoulder Press      Standing Wand ER/IR      Shrugs AROM       Pendulum Ex            Supine Flex  TE   S-lying ABD  TE   S-lying ER  TE        Prone Flexion     Prone Ext     Prone Row with ER     Prone Horizontal ABD            Standing Flex  4# 15 reps x 1 set Increase to 4 or 5#    Standing ER   4# 15 reps x 1 set \"    Standing IR   4# 15 reps x 1 set  \"    Standing Scaption   \"    Standing ABD  4# 15 reps x 1 set  To 90° \"    Standing Shoulder Press   4#15 reps x 1 set  \"          ROWS: H   Presley 2 x 15Gray NEXT    ROWS: Omar Conception 2 x 15\"    ROWS: L      ER Black  2 x 15 \"    IR Black 2 x 15 \"    Punches    Presley 2 x 15      tricep ext  Gray 2 x 15      Bicep curls  Gray 2 x 15      Shoulder Press Shoulder Flex      Shoulder ABD 90 deg IR    4# 15 reps x 2 sets       D1+ D2  Black 15 reps x 2 sets extension            Shoulder Flexion with Ball on Wall      Shoulder Flexion with Nuts and Bolts            Weighted Machines      Lat Pull Down      Vertical Row      A:  Tolerated well   . No new changes noted . Pt able to tolerate all ex's . . as listed above . P: Continue with rehab plan  .    ( 24 total visits available ) next  RTD  visit 2/23 /2022   Rachell Jones PTA    Treatment Charges: Mins Units   Initial Evaluation     Re-Evaluation     Ther Exercise         TE 40 3   Manual Therapy     MT     Ther Activities        TA     Gait Training          GT     Neuro Re-education NR     Modalities     Non-Billable Service Time     Other     Total Time/Units 40 3

## 2022-01-31 ENCOUNTER — TREATMENT (OUTPATIENT)
Dept: PHYSICAL THERAPY | Age: 62
End: 2022-01-31
Payer: COMMERCIAL

## 2022-01-31 DIAGNOSIS — M75.01 ADHESIVE CAPSULITIS OF RIGHT SHOULDER: Primary | ICD-10-CM

## 2022-01-31 PROCEDURE — 97110 THERAPEUTIC EXERCISES: CPT

## 2022-01-31 NOTE — PROGRESS NOTES
Physical Therapy Daily Treatment Note    Date: 2022  Patient Name: Catina Ferrer  : 1960   MRN: [id number]  DOInjury: 3 months  DOSx: None   Referring Provider:  Kenroy Garvey DO    Medical Diagnosis:      Diagnosis Orders   1. Adhesive capsulitis of right shoulder         Outcome Measure: Quick Dash: 34% Impairment    Mid point Quick Dash score 2021= 27%     S: Pt reports general soreness today . Pt in agreement x 1 more session and then discharge with HEP . O: Pt given written HEP  Time 7400-3117     Visit   visits available   Repeat outcome measure at mid point and end.     Pain 0-4 /10     ROM Right Shoulder:  AROM: 165° Forward elevation,  70° ER,  IR to 40  PROM: 165° Forward elevation,  80° ER,  45° IR 2022    Modalities            Manual                 Stretch      Table slides      Wall Walk Flex     Wall Walk ABD     Wall Pec/ER Stretch     Wall ER Stretch     Towel IR Stretch     Cross Body Adduction     Standing Stretch with Arms Behind Head           Exercise      UBE     3/3     Pulleys - Abd NEXT with holds TE   Pulleys - IR NEXT with holds TE        Supine Wand Flex  TE   Supine Wand Bench Press  TE   Supine Wand ER/IR  TE   Standing Wand IR  TE   Standing Wand Scaption NEXT with holds    Standing Wand Flex      Standing Wand Shoulder Press      Standing Wand ER/IR      Shrugs AROM       Pendulum Ex            Supine Flex  TE   S-lying ABD  TE   S-lying ER  TE        Prone Flexion     Prone Ext     Prone Row with ER     Prone Horizontal ABD            Standing Flex  4# 15 reps x 1 set Increase to 4 or 5#    Standing ER   4# 15 reps x 1 set \"    Standing IR   4# 15 reps x 1 set  \"    Standing Scaption   \"    Standing ABD  4# 15 reps x 1 set  To 90° \"    Standing Shoulder Press   4#15 reps x 1 set  \"          ROWS: H   Presley 2 x 15Gray NEXT    ROWS: Minus French 2 x 15\"    ROWS: L      ER Black  2 x 15 \"    IR Black 2 x 15 \"    Gabby Meter 2 x 15      tricep ext  [place] Granger 2 x 15      Bicep curls  Gray 2 x 15      Shoulder Press      Shoulder Flex      Shoulder ABD 90 deg IR    4# 15 reps x 2 sets       D1+ D2  Black 15 reps x 2 sets extension            Shoulder Flexion with Ball on Wall      Shoulder Flexion with Nuts and Bolts            Weighted Machines      Lat Pull Down      Vertical Row      A:  Tolerated well   . No new changes noted . Pt able to tolerate all ex's . . as listed above . P: Continue with rehab plan  . ( 24 total visits available ) next  RTD  visit 2/23 /2022 also last rx session  on this day.  Sukumar Dixon, OSMAN    Treatment Charges: Mins Units   Initial Evaluation     Re-Evaluation     Ther Exercise         TE 40 3   Manual Therapy     MT     Ther Activities        TA     Gait Training          GT     Neuro Re-education NR     Modalities     Non-Billable Service Time     Other     Total Time/Units 40 3

## 2022-02-23 ENCOUNTER — TREATMENT (OUTPATIENT)
Dept: PHYSICAL THERAPY | Age: 62
End: 2022-02-23
Payer: COMMERCIAL

## 2022-02-23 ENCOUNTER — OFFICE VISIT (OUTPATIENT)
Dept: ORTHOPEDIC SURGERY | Age: 62
End: 2022-02-23
Payer: COMMERCIAL

## 2022-02-23 VITALS — BODY MASS INDEX: 29.26 KG/M2 | TEMPERATURE: 98 F | HEIGHT: 74 IN | WEIGHT: 228 LBS

## 2022-02-23 DIAGNOSIS — M75.01 ADHESIVE CAPSULITIS OF RIGHT SHOULDER: Primary | ICD-10-CM

## 2022-02-23 PROCEDURE — 97110 THERAPEUTIC EXERCISES: CPT

## 2022-02-23 PROCEDURE — 99213 OFFICE O/P EST LOW 20 MIN: CPT | Performed by: ORTHOPAEDIC SURGERY

## 2022-02-23 RX ORDER — MELOXICAM 15 MG/1
15 TABLET ORAL DAILY
Qty: 30 TABLET | Refills: 2 | Status: SHIPPED | OUTPATIENT
Start: 2022-02-23

## 2022-02-23 NOTE — PROGRESS NOTES
Physical Therapy Daily Treatment Note    Date: 2022  Patient Name: Fredi Urban  : 1960   MRN: [id number]  DOInjury: 3 months  DOSx: None   Referring Provider:  Carine Angeles DO      Medical Diagnosis:    Diagnosis Orders   1. Adhesive capsulitis of right shoulder         Outcome Measure: Quick Dash: 34% Impairment - Mid point Quick Dash score 2021= 27%     S: Pt reports feeling good, still has some difficulty sleeping. O: Pt given written HEP  Time 2180-7837     Visit   visits available   Repeat outcome measure at mid point and end.     Pain 0-4 /10     ROM Right Shoulder:  AROM: 165° Forward elevation,  70° ER,  IR to 40  PROM: 165° Forward elevation,  80° ER,  45° IR 2022    Modalities            Manual                 Stretch      Table slides      Wall Walk Flex     Wall Walk ABD     Wall Pec/ER Stretch     Wall ER Stretch     Towel IR Stretch     Cross Body Adduction     Standing Stretch with Arms Behind Head           Exercise      UBE     3/3     Pulleys - Abd  TE   Pulleys - IR  TE        Supine Wand Flex  TE   Supine Wand Bench Press  TE   Supine Wand ER/IR  TE   Standing Wand IR  TE   Standing Wand Scaption     Standing Wand Flex      Standing Wand Shoulder Press      Standing Wand ER/IR      Shrugs AROM       Pendulum Ex            Supine Flex  TE   S-lying ABD  TE   S-lying ER  TE        Prone Flexion     Prone Ext     Prone Row with ER     Prone Horizontal ABD            Standing Flex  4# 15 reps x 1 set     Standing ER   4# 15 reps x 1 set     Standing IR   4# 15 reps x 1 set      Standing Scaption       Standing ABD  4# 15 reps x 1 set  To 90°     Standing Shoulder Press   4# 15 reps x 1 set            ROWS: Anneliese Repine 2 x 15    ROWS: M  Presley 2 x 15    ROWS: L      ER Black  2 x 15     IR Black 2 x 15     Punches  Presley 2 x 15      tricep ext  Gray 2 x 15      Bicep curls  Gray 2 x 15      Shoulder Press      Shoulder Flex      Shoulder ABD 90 deg IR  4# 15 reps x 2 sets       D1+ D2  Black 15 reps x 2 sets extension            Shoulder Flexion with Ball on Wall      Shoulder Flexion with Nuts and Bolts            Weighted Machines      Lat Pull Down      Vertical Row      A: Tolerated well. P: Today was patient's last session and they are to continue with HEP.   Lawerence Coldspring, PTA    Treatment Charges: Mins Units   Initial Evaluation     Re-Evaluation     Ther Exercise         TE 40 3   Manual Therapy     MT     Ther Activities        TA     Gait Training          GT     Neuro Re-education NR     Modalities     Non-Billable Service Time     Other     Total Time/Units 40 3

## 2022-02-23 NOTE — PROGRESS NOTES
Chief Complaint   Patient presents with    Shoulder Pain     f/u right shoulder LASHAE. Patient c/o limited motion and pain at night. HPI:    Patient is 57 y.o. male complaining of atraumatic insidious onset of Right shoulder pain and weakness for 3-4 months. Patient does not recall an injury but noticed that he had progressive weakness. He is right-hand-dominant but noticed that her left shoulder was getting weak when reaching out for such things as pens or pieces of paper. Patient is now [place] [facility] [place]. He is improving and currently still in PT.        ROS:    Skin: (-) rash,(-) psoriasis,(-) eczema, (-)skin cancer. Neurologic: (-)numbness, (-)tingling, (-)headaches, (-) LOC. Cardiovascular: (-) Chest pain, (-) swelling in legs/feet, (-) SOB, (-) cramping in legs/feet with walking.     All other review of systems negative except stated above or in HPI      Past Medical History:   Diagnosis Date    Diabetes mellitus ([facility] [address]     Environmental and seasonal allergies     Hyperlipidemia     Shingles      Past Surgical History:   Procedure Laterality Date    CLOSED MANIPULATION SHOULDER Left 5/14/2019    LEFT SHOULDER MANIPULATION UNDER ANESTHESIA performed by Sheron Chong DO at [address]    SHOULDER SURGERY Left 05/14/2019    manipulation     SHOULDER SURGERY Right 11/9/2021    RIGHT SHOULDER MANIPULATION UNDER ANESTHESIA performed by Sheron Chong DO at [facility]       Current Outpatient Medications:     naproxen (NAPROSYN) 500 MG tablet, Take 1 tablet by mouth 2 times daily (with meals), Disp: 60 tablet, Rfl: 0    Omega-3 Fatty Acids (FISH OIL) 1000 MG CAPS, Take 1,000 mg by mouth 2 times daily, Disp: , Rfl:     glimepiride (AMARYL) 2 MG tablet, Take by mouth every morning (before breakfast) , Disp: , Rfl:     TRUE METRIX BLOOD GLUCOSE TEST strip, , Disp: , Rfl:     TRUEplus Lancets 33G MISC, , Disp: , Rfl:   SITagliptin (JANUVIA) 25 MG tablet, Take 25 mg by mouth daily, Disp: , Rfl:     rosuvastatin (CRESTOR) 10 MG tablet, Take 10 mg by mouth daily , Disp: , Rfl: 5    metFORMIN (GLUCOPHAGE) 1000 MG tablet, Take 1,000 mg by mouth 2 times daily (with meals), Disp: , Rfl:     montelukast (SINGULAIR) 10 MG tablet, Take 10 mg by mouth nightly, Disp: , Rfl:   Allergies   Allergen Reactions    Other      Canteloupe gives him hives     Social History     Socioeconomic History    Marital status:      Spouse name: Not on file    Number of children: Not on file    Years of education: Not on file    Highest education level: Not on file   Occupational History    Not on file   Tobacco Use    Smoking status: Never Smoker    Smokeless tobacco: Never Used   Vaping Use    Vaping Use: Never used   Substance and Sexual Activity    Alcohol use: Yes     Comment: social    Drug use: No    Sexual activity: Yes     Partners: Female   Other Topics Concern    Not on file   Social History Narrative    Not on file     Social Determinants of Health     Financial Resource Strain:     Difficulty of Paying Living Expenses: Not on file   Food Insecurity:     Worried About Running Out of Food in the Last Year: Not on file    Risa of Food in the Last Year: Not on file   Transportation Needs:     Lack of Transportation (Medical): Not on file    Lack of Transportation (Non-Medical):  Not on file   Physical Activity:     Days of Exercise per Week: Not on file    Minutes of Exercise per Session: Not on file   Stress:     Feeling of Stress : Not on file   Social Connections:     Frequency of Communication with Friends and Family: Not on file    Frequency of Social Gatherings with Friends and Family: Not on file    Attends Jain Services: Not on file    Active Member of Clubs or Organizations: Not on file    Attends Club or Organization Meetings: Not on file    Marital Status: Not on file   Intimate Partner Violence:     Fear of Current or Ex-Partner: Not on file    Emotionally Abused: Not on file    Physically Abused: Not on file    Sexually Abused: Not on file   Housing Stability:     Unable to Pay for Housing in the Last Year: Not on file    Number of Places Lived in the Last Year: Not on file    Unstable Housing in the Last Year: Not on file     No family history on file. Physical Exam:    There were no vitals taken for this visit. GENERAL: alert, appears stated age, cooperative, no acute distress    HEENT: Head is normocephalic, atraumatic. PERRLA. SKIN: Clean, dry, intact. There is not any cellulitis or cutaneous lesions noted in the lower extremities    PULMONARY: breathing is regular and unlabored, no acute distress    CV: The bilateral upper and lower extremities are warm and well-perfused with brisk capillary refill. 2+ pulses UE and LE bilateral.     PSYCHIATRY: Pleasant mood, appropriate behavior, follows commands    NEURO: Sensation is intact distally with light touch with no alteration. Motor exam of the lower extremities show quadriceps, hamstrings, foot dorsiflexion and plantarflexion grossly intact 5/5. LYMPH: No lymphedema present distally in upper or lower extremity. MUSCULOSKELETAL:  Shoulder Exam:  Examination of the Right shoulder shows: There is not a deformity. There is not erythema. There is not soft tissue swelling. Deltoid region is  tender to palpation. AC Joint is  tender to palpation. Clavicle is not tender to palpation. Bicipital Groove is not tender to palpation. Pectoralis  is not tender to palpation. Scapula/ trapezius is  tender to palpation. There is  weakness with supraspinatus testing. There is  pain with supraspinatus testing. Yergason Test negative. Drop Arm Test positive. Apprehension Test not tested. Cross Arm Test positive. Supraspinatus Test positive.   Shoulder ROM- 180/180/50          Imaging:  EXAMINATION:   THREE XRAY VIEWS OF THE RIGHT SHOULDER       6/30/2021 6:35 pm       COMPARISON:   None.       HISTORY:   ORDERING SYSTEM PROVIDED HISTORY: Right shoulder pain, unspecified chronicity       FINDINGS:   Glenohumeral joint is normally aligned.  No evidence of acute fracture or   dislocation.  No abnormal periarticular calcifications.  AC joint   degenerative changes.       Visualized lung is unremarkable.           Impression   No acute abnormality.       AC joint degenerative changes.          EXAMINATION:   MRI OF THE RIGHT SHOULDER WITHOUT CONTRAST   10/27/2021 9:48 am       TECHNIQUE:   Multiplanar multisequence MRI of the right shoulder was performed without the   administration of intravenous contrast.       COMPARISON:   Right shoulder x-rays 06/30/2021       HISTORY:   ORDERING SYSTEM PROVIDED HISTORY: Tear of right rotator cuff, unspecified   tear extent, unspecified whether traumatic       FINDINGS:   ROTATOR CUFF: Low-grade partial-thickness bursal-surface tear involving   supraspinatus tendon mid fibers in region of critical zone with interstitial   extension extending to attachment on greater tuberosity (best appreciated   image 12, coronal fat-suppressed T2-weighted sequence, series 5).  Remainder   of the rotator cuff appears intact.  Mild superimposed tendinosis to the   supraspinatus as well as the infraspinatus and subscapularis tendons.  Muscle   mass and muscle signal intensities are maintained.       BICEPS TENDON: Long head biceps tendon appears intact, appropriately located   and normal in appearance.  Mild tenosynovitis.       LABRUM: Degeneration and tearing of the inferior labrum.  No paralabral cyst.       GLENOHUMERAL JOINT: No degenerative changes noted.  No joint effusion or   intra-articular loose bodies.  Thickening and intrinsic T2 hyperintensity of   the inferior capsuloligamentous structures of the glenohumeral joint without   focal discontinuity noted.  Minimal pericapsular edema inferiorly.       AC JOINT AND ACROMIOCLAVICULAR ARCH: Mild degenerative changes to the [facility]   joint.  No findings for [facility] joint separation.  Acromiohumeral interval is   within normal limits.  No significant fluid in the subacromial/subdeltoid   bursa.       BONE MARROW: No evidence for occult fracture.  No suspicious focal bony   lesions.       OUTLET SPACES: No focal abnormalities are seen in the outlet spaces.           Impression   Low-grade partial-thickness bursal-surface tear of supraspinatus tendon with   interstitial extension extending distally towards greater tuberosity   attachment.  Mild superimposed tendinosis to the tendon as well as to the   infraspinatus and subscapularis tendons.       Degeneration and tearing of the inferior labrum without paralabral cyst noted.       Thickening and abnormal intrinsic signal change to the inferior   capsuloligamentous structures of the glenohumeral joint without focal   discontinuity noted.  Minimal adjacent pericapsular edema.  Findings are   nonspecific but can be seen in the setting of adhesive capsulitis.       Mild tenosynovitis to the long head biceps tendon. Beth Douglas was seen today for shoulder pain. Diagnoses and all orders for this visit:    Adhesive capsulitis of right shoulder    Other orders  -     meloxicam (MOBIC) 15 MG tablet; Take 1 tablet by mouth daily Take with food. Patient seen and examined. X-rays reviewed. Patient has had insidious onset chronic pain and weakness of Right shoulder. MRI reviewed today showing signs of rotator cuff tendinopathy but more importantly adhesive capsulitis. Exam was consistent with adhesive capsulitis. Patient was educated about this condition as well as treatment options. On today's follow-up after anyway, patient has had almost complete relief of stiffness with improved abduction and forward flexion.  External rotation is still somewhat limited but improved since last visit patient has been very aggressive with HEP and with physical therapy. Continue with current treatment protocol. In a 15 minute assessment and discussion, patient was counseled on weight loss, healthy diet, and physical activity relating to this condition. He was educated with options in detail including nutrition, joining a health club/ weight loss program, and use of cardio equipment such as the Arc Trainer and the importance of use as well as range of motion and HEP exercises for weight loss and general health.        Cristina Bernal, DO

## (undated) DEVICE — 12 ML SYRINGE,LUER-LOCK TIP: Brand: MONOJECT

## (undated) DEVICE — STANDARD HYPODERMIC NEEDLE,POLYPROPYLENE HUB: Brand: MONOJECT

## (undated) DEVICE — NEEDLE HYPO 18GA L1.5IN PNK POLYPR HUB S STL THN WALL FILL